# Patient Record
Sex: FEMALE | Race: WHITE | NOT HISPANIC OR LATINO | URBAN - METROPOLITAN AREA
[De-identification: names, ages, dates, MRNs, and addresses within clinical notes are randomized per-mention and may not be internally consistent; named-entity substitution may affect disease eponyms.]

---

## 2023-10-31 ENCOUNTER — INPATIENT (INPATIENT)
Facility: HOSPITAL | Age: 58
LOS: 4 days | Discharge: HOME HEALTH | DRG: 950 | End: 2023-11-05
Attending: PHYSICAL MEDICINE & REHABILITATION | Admitting: PHYSICAL MEDICINE & REHABILITATION
Payer: COMMERCIAL

## 2023-10-31 VITALS
HEIGHT: 63 IN | TEMPERATURE: 98 F | WEIGHT: 177.69 LBS | RESPIRATION RATE: 17 BRPM | DIASTOLIC BLOOD PRESSURE: 80 MMHG | SYSTOLIC BLOOD PRESSURE: 139 MMHG | OXYGEN SATURATION: 94 % | HEART RATE: 70 BPM

## 2023-10-31 DIAGNOSIS — Z98.890 OTHER SPECIFIED POSTPROCEDURAL STATES: Chronic | ICD-10-CM

## 2023-10-31 DIAGNOSIS — Z98.891 HISTORY OF UTERINE SCAR FROM PREVIOUS SURGERY: Chronic | ICD-10-CM

## 2023-10-31 DIAGNOSIS — Z96.611 PRESENCE OF RIGHT ARTIFICIAL SHOULDER JOINT: ICD-10-CM

## 2023-10-31 LAB
SARS-COV-2 RNA SPEC QL NAA+PROBE: SIGNIFICANT CHANGE UP
SARS-COV-2 RNA SPEC QL NAA+PROBE: SIGNIFICANT CHANGE UP

## 2023-10-31 RX ORDER — POLYETHYLENE GLYCOL 3350 17 G/17G
17 POWDER, FOR SOLUTION ORAL DAILY
Refills: 0 | Status: DISCONTINUED | OUTPATIENT
Start: 2023-10-31 | End: 2023-11-05

## 2023-10-31 RX ORDER — MORPHINE SULFATE 50 MG/1
15 CAPSULE, EXTENDED RELEASE ORAL EVERY 4 HOURS
Refills: 0 | Status: DISCONTINUED | OUTPATIENT
Start: 2023-10-31 | End: 2023-10-31

## 2023-10-31 RX ORDER — MORPHINE SULFATE 50 MG/1
30 CAPSULE, EXTENDED RELEASE ORAL EVERY 4 HOURS
Refills: 0 | Status: DISCONTINUED | OUTPATIENT
Start: 2023-10-31 | End: 2023-11-05

## 2023-10-31 RX ORDER — INFLUENZA VIRUS VACCINE 15; 15; 15; 15 UG/.5ML; UG/.5ML; UG/.5ML; UG/.5ML
0.5 SUSPENSION INTRAMUSCULAR ONCE
Refills: 0 | Status: DISCONTINUED | OUTPATIENT
Start: 2023-10-31 | End: 2023-11-05

## 2023-10-31 RX ORDER — ONDANSETRON 8 MG/1
4 TABLET, FILM COATED ORAL EVERY 6 HOURS
Refills: 0 | Status: DISCONTINUED | OUTPATIENT
Start: 2023-10-31 | End: 2023-11-05

## 2023-10-31 RX ORDER — SENNA PLUS 8.6 MG/1
2 TABLET ORAL AT BEDTIME
Refills: 0 | Status: DISCONTINUED | OUTPATIENT
Start: 2023-10-31 | End: 2023-11-05

## 2023-10-31 RX ORDER — LANOLIN ALCOHOL/MO/W.PET/CERES
3 CREAM (GRAM) TOPICAL AT BEDTIME
Refills: 0 | Status: DISCONTINUED | OUTPATIENT
Start: 2023-10-31 | End: 2023-10-31

## 2023-10-31 RX ORDER — CEPHALEXIN 500 MG
500 CAPSULE ORAL
Refills: 0 | Status: DISCONTINUED | OUTPATIENT
Start: 2023-10-31 | End: 2023-11-05

## 2023-10-31 RX ORDER — ENOXAPARIN SODIUM 100 MG/ML
40 INJECTION SUBCUTANEOUS EVERY 24 HOURS
Refills: 0 | Status: DISCONTINUED | OUTPATIENT
Start: 2023-10-31 | End: 2023-11-05

## 2023-10-31 RX ORDER — MORPHINE SULFATE 50 MG/1
15 CAPSULE, EXTENDED RELEASE ORAL EVERY 4 HOURS
Refills: 0 | Status: DISCONTINUED | OUTPATIENT
Start: 2023-10-31 | End: 2023-11-05

## 2023-10-31 RX ORDER — MORPHINE SULFATE 50 MG/1
30 CAPSULE, EXTENDED RELEASE ORAL EVERY 4 HOURS
Refills: 0 | Status: DISCONTINUED | OUTPATIENT
Start: 2023-10-31 | End: 2023-10-31

## 2023-10-31 RX ORDER — GABAPENTIN 400 MG/1
100 CAPSULE ORAL THREE TIMES A DAY
Refills: 0 | Status: DISCONTINUED | OUTPATIENT
Start: 2023-10-31 | End: 2023-11-05

## 2023-10-31 RX ORDER — DIPHENHYDRAMINE HCL 50 MG
25 CAPSULE ORAL EVERY 6 HOURS
Refills: 0 | Status: DISCONTINUED | OUTPATIENT
Start: 2023-10-31 | End: 2023-11-05

## 2023-10-31 RX ORDER — ACETAMINOPHEN 500 MG
975 TABLET ORAL EVERY 8 HOURS
Refills: 0 | Status: DISCONTINUED | OUTPATIENT
Start: 2023-10-31 | End: 2023-11-05

## 2023-10-31 RX ORDER — LANOLIN ALCOHOL/MO/W.PET/CERES
6 CREAM (GRAM) TOPICAL AT BEDTIME
Refills: 0 | Status: DISCONTINUED | OUTPATIENT
Start: 2023-10-31 | End: 2023-11-05

## 2023-10-31 RX ADMIN — Medication 5 MILLIGRAM(S): at 21:49

## 2023-10-31 RX ADMIN — Medication 6 MILLIGRAM(S): at 21:49

## 2023-10-31 RX ADMIN — Medication 975 MILLIGRAM(S): at 21:49

## 2023-10-31 RX ADMIN — GABAPENTIN 100 MILLIGRAM(S): 400 CAPSULE ORAL at 22:05

## 2023-10-31 RX ADMIN — MORPHINE SULFATE 30 MILLIGRAM(S): 50 CAPSULE, EXTENDED RELEASE ORAL at 22:22

## 2023-10-31 RX ADMIN — SENNA PLUS 2 TABLET(S): 8.6 TABLET ORAL at 21:49

## 2023-10-31 RX ADMIN — Medication 975 MILLIGRAM(S): at 22:22

## 2023-10-31 RX ADMIN — MORPHINE SULFATE 30 MILLIGRAM(S): 50 CAPSULE, EXTENDED RELEASE ORAL at 21:49

## 2023-10-31 NOTE — H&P ADULT - NSHPREVIEWOFSYSTEMS_GEN_ALL_CORE
REVIEW OF SYSTEMS  Constitutional: No fever, No Chills, No fatigue  HEENT: No eye pain, No visual disturbances, No difficulty hearing  Pulm: No cough,  No shortness of breath  Cardio: No chest pain, No palpitations  GI:  No abdominal pain, + nausea from car ride, No vomiting, No diarrhea, No constipation  : No dysuria, No frequency, No hematuria  Neuro: No headaches, No memory loss, No loss of strength, No numbness, No tremors  Skin: No itching, No rashes, No lesions   Endo: No temperature intolerance  MSK: + joint pain, No joint swelling, + muscle pain, No Neck or back pain  Psych:  No depression, No anxiety Constitutional: No fever, No Chills, No fatigue  HEENT: No eye pain, No visual disturbances, No difficulty hearing  Pulm: No cough,  No shortness of breath  Cardio: No chest pain, No palpitations  GI:  No abdominal pain, + nausea from car ride, No vomiting, No diarrhea, No constipation  : No dysuria, No frequency, No hematuria  Neuro: No headaches, No memory loss, No loss of strength, No numbness, No tremors  Skin: No itching, No rashes, No lesions   Endo: No temperature intolerance  MSK: + joint pain, No joint swelling, + muscle pain, No Neck or back pain  Psych:  No depression, No anxiety

## 2023-10-31 NOTE — H&P ADULT - NSICDXPASTSURGICALHX_GEN_ALL_CORE_FT
PAST SURGICAL HISTORY:  H/O  section     H/O myomectomy     History of Nissen fundoplication     S/P repair of paraesophageal hernia

## 2023-10-31 NOTE — H&P ADULT - ATTENDING COMMENTS
I independently performed the documented the history, exam, and medical decision making. I have made amendments to the documentation where necessary. I have personally seen and examined the patient. Medical records were reviewed and I have made amendments to the documentation where necessary and adjusted the history, physical examination, and plan as documented by the Resident Physician. Patient was seen and evaluated at bedside today. Reported no overnight events and is in no acute distress. Patient was thoroughly informed regarding precautions prior to admission by her surgical team. A discussion took place with her Orthopedic Surgeon, Dr. Nahid Du, regarding post-surgical precautions and these were communicated with OT and PT team. She is eager to participate on the recommended rehabilitation program. Denies any CP, SOB, LEOS, palpitations, fever, chills, body aches, cough, congestion, or any other symptoms at this time. Admission vitals, labs, and physical exam are outlined below.      LAB                        10.3   5.80  )-----------( 339      ( 01 Nov 2023 05:32 )             32.8     11-01    141  |  106  |  15  ----------------------------<  99  4.2   |  30  |  0.60    Ca    8.6      01 Nov 2023 05:32    TPro  5.7<L>  /  Alb  2.5<L>  /  TBili  0.2  /  DBili  x   /  AST  15  /  ALT  14  /  AlkPhos  67  11-01    LIVER FUNCTIONS - ( 01 Nov 2023 05:32 )  Alb: 2.5 g/dL / Pro: 5.7 g/dL / ALK PHOS: 67 U/L / ALT: 14 U/L / AST: 15 U/L / GGT: x             PHYSICAL EXAM  Vital Signs Last 24 Hrs  T(C): 36.6 (01 Nov 2023 08:02), Max: 36.9 (31 Oct 2023 18:30)  T(F): 97.9 (01 Nov 2023 08:02), Max: 98.5 (31 Oct 2023 18:30)  HR: 66 (01 Nov 2023 08:02) (66 - 74)  BP: 130/81 (01 Nov 2023 08:02) (130/76 - 139/80)  RR: 16 (01 Nov 2023 08:02) (15 - 17)  SpO2: 94% (01 Nov 2023 08:02) (94% - 96%)    Gen - NAD, Comfortable  HEENT - NCAT, EOMI  Pulm - CTAB, No wheeze, No rhonchi, No crackles  Cardiovascular - RRR, S1S2, No murmurs  Abdomen - Soft, NT/ND, +BS  Extremities - No C/C/E, No calf tenderness  Neuro-     Cognitive - AAOx3     Motor -                     LEFT    UE - ShAB 5/5, EF 5/5, EE 5/5, WE 5/5,  5/5                    RIGHT UE - unable to fully assess due to right jodi-shoulder brace. Patient able to extend and flex fingers and wrist within immobilizer                    LEFT    LE - HF 5/5, KE 5/5, DF 5/5, PF 5/5                    RIGHT LE - HF 5/5, KE 5/5, DF 5/5, PF 5/5        Sensory - Intact to LT distally     Reflexes - DTR Intact, No primitive reflexive  Psychiatric - Mood stable, Affect WNL; some anxiety expressed given post-surgicasl state  Skin:  large incisional sites across the biceps, right deltoid, right shoulder as well as across the back with a JAIMIE drain. No erythema, purulence, or fluctuance around incisional sites or JAIMIE drain insertion.  Wounds: None Present

## 2023-10-31 NOTE — H&P ADULT - NSHPSOCIALHISTORY_GEN_ALL_CORE
SOCIAL HISTORY  Smoking -  denies  EtOH - denies  Marital Status -   occupation: compu sci    Previous Functional Status:  Independent in ambulation, ADL's, transfers prior to hospitalization    Current Functional Status:  Bed mobility:   Transfers: sit-to-stand min-A. Supine to sit supervision.    Gait / ambulation:   ADL's: mod-I grooming/toileting/rolling/scooting/StS.   Speech: No swallowing deficits. SOCIAL HISTORY  Smoking -  former smoker 4 cig/day for 4 years over 30 years ago   EtOH - denies  Denied illicit substances and marijuana    occupation: compu sci    Previous Functional Status:  Independent in ambulation, ADL's, transfers prior to hospitalization    Current Functional Status:  Bed mobility:   Transfers: sit-to-stand min-A. Supine to sit supervision.    Gait / ambulation:   ADL's: mod-I grooming/toileting/rolling/scooting/StS.   Speech: No swallowing deficits.

## 2023-10-31 NOTE — H&P ADULT - NSHPLABSRESULTS_GEN_ALL_CORE
Laboratory-Chemistry:    10/24/2023    Glucose: 99    Sodium: 142    Potassium: 4.6    Blood Urea Nitrogen : 14    Creatinine: 0.6     Hematology:    10/24/2023    WBC: 4.8    HgB: 9.8    Hct: 30.7    Platelets: 245     Cultures:     10/24/2023    Covid -19 PCR: not detected   RSV type A: not detected   RSV type B: not detected   Adenovirus DNA: not detected  Human metapneumovirus RNA: not detected  Human rhinovirus/enterovirus RNA: not detected  Influenza type A RNA: not detected   Influenza type B RNA: not detected   Mycoplasma pneumoniae DNA: not detected   Chlamydophilia pneumoniae DNA: not detected  Parainfluenza virus type 1, 2, 3, 4 RNA: not detected     Urine Legionella: negative     Sputum expectorated: no fungal structures seen   Radiology:     10/18/2023  Right shoulder xray:   1. Since September 28, 2023  - right shoulder arthroplasty  2. No periprosthetic fractures or lucencies Laboratory-Chemistry:    10/24/2023    Glucose: 99    Sodium: 142    Potassium: 4.6    Blood Urea Nitrogen : 14    Creatinine: 0.6     Hematology:    10/24/2023    WBC: 4.8    HgB: 9.8    Hct: 30.7    Platelets: 245     Cultures:     10/24/2023    Covid -19 PCR: not detected   RSV type A: not detected   RSV type B: not detected   Adenovirus DNA: not detected  Human metapneumovirus RNA: not detected  Human rhinovirus/enterovirus RNA: not detected  Influenza type A RNA: not detected   Influenza type B RNA: not detected   Mycoplasma pneumoniae DNA: not detected   Chlamydophilia pneumoniae DNA: not detected  Parainfluenza virus type 1, 2, 3, 4 RNA: not detected     Urine Legionella: negative     Sputum expectorated: no fungal structures seen   Radiology:     10/18/2023  Right shoulder xray:   1. Since September 28, 2023  - right shoulder arthroplasty  2. No periprosthetic fractures or lucencies    X-ray 9/28/2023 shows unchanged mass extending from the right humeral head into the proximal metadiaphysis with an extraosseous soft tissue component consistent with chondrosarcoma

## 2023-10-31 NOTE — PATIENT PROFILE ADULT - FALL HARM RISK - HARM RISK INTERVENTIONS

## 2023-10-31 NOTE — H&P ADULT - NSICDXPASTMEDICALHX_GEN_ALL_CORE_FT
PAST MEDICAL HISTORY:  Chondrosarcoma     Chronic GERD     Hiatal hernia     [place] (hard of hearing)     Osteoarthritis of both knees     Synovial chondromatosis

## 2023-10-31 NOTE — H&P ADULT - NSHPPHYSICALEXAM_GEN_ALL_CORE
Gen - NAD, Comfortable  HEENT - NCAT, EOMI, MMM  Neck - Supple, No limited ROM  Pulm - CTAB, No wheeze, No rhonchi, No crackles  Cardiovascular - RRR, S1S2, No murmurs  Abdomen - Soft, NT/ND, +BS  Extremities - No C/C/E, No calf tenderness  Neuro-     Cognitive - AAOx3     Communication - Fluent, No dysarthria     Attention: Intact      Judgement: Good evidence of judgement     Memory: Recall 3 objects immediate and 3 min later         Cranial Nerves - CN 2-12 intact     Motor -                     LEFT    UE - ShAB 5/5, EF 5/5, EE 5/5, WE 5/5,  5/5                    RIGHT UE - unable to fully assess. Patient able to extend and flex fingers and wrist within immobilizer                    LEFT    LE - HF 5/5, KE 5/5, DF 5/5, PF 5/5                    RIGHT LE - HF 5/5, KE 5/5, DF 5/5, PF 5/5        Sensory - Intact to LT     Reflexes - DTR Intact, No primitive reflexive     Coordination - FTN intact     Tone - normal  Psychiatric - Mood stable, Affect WNL  Skin:  large incisional sites across the biceps, right deltoid, right shoulder as well as across the back with a JAIMIE drain. No erythema, purulence, or fluctuance around incisional sites or JAIMIE drain insertion.  Wounds: None Present

## 2023-10-31 NOTE — H&P ADULT - ASSESSMENT
58 y.o [place] female PMH GERD (s/p Nissen fundoplication x3), hiatal and paraesophageal hernia (laparoscopic paraesophageal hernia repair) , bursitis, b/l chronic knee pain, synovial chondromatosis, partial paralysis of vocal cords,  myomectomy, sinus surgery, , ear surgery, right shoulder surgery x6 presented to [facility] [place] on 10/18 for chondrosarcoma of the right proximal humerus and is s/p composite resection of the tumor including axillary nerve, lateral deltoid, humeral head and total shoulder arthroplasty with allograft and Latissmus dorsus rotational flap. Right UE is NWB throughout with no R-arm abduction past 90 degrees. JAIMIE drain remains upon discharge to rehab and should be stripped at least twice a day by rehab staff. Total output number should be recorded and uploaded to patient's portal. Output must be less than 30cc for 2 days before removal. Patient was evaluated by PM&R and therapy for functional deficits, gait/ADL impairments and acute rehabilitation was recommended. Patient was medically optimized for discharge to [facility] IRU on 10/31.       IVAN REY is a 58y [place] female with GERD (s/p Nissen fundoplication x3), hiatal and paraesophageal hernia (laparoscopic paraesophageal hernia repair) , bursitis, b/l chronic knee pain, synovial chondromatosis, partial paralysis of vocal cords,  myomectomy, sinus surgery, , ear surgery, right shoulder surgery x6 presented to [facility] [place] for R humerus bone lesion ccb increased pain and limited movement. 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 She is now s/p wide local resection of R shoulder chondrosarcoma & reconstruction with Allograft, Latissmus dorsus rotational flap on 10/18. Patient now admitted for a multidisciplinary rehab program. 10-31-23.       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 P&O as needed       HTN  -   - Monitor BP    HLD  - cont statin    T2DM  - Lantus  - Premeal  - SSI  - Monitor fingersticks    Pain  - Tylenol PRN  - Avoid sedating medications that may interfere with cognitive recovery    Mood / Cognition  - Neuropsychology consult  - [ ] Enhanced Supervision  [ ] Constant Observation    Sleep  - Maintain quiet hours and a low stim environment.   - Monitor sleep logs (for BIU)  - Melatonin PRN     GI / Bowel  - Senna qHS  - Miralax PRN Daily  - GI ppx: ***     / Bladder  - Currently patient voids:      [ ] independent      [ ] external collection device (condom cath)      [ ] Indwelling bowman catheter      [ ] Intermittent catheterization  - Continue bladder scans Q8 hours with straight cath for >400cc.  - Toileting schedule every 4 hours    Skin / Pressure injury  - Skin assessment on admission performed [  ] :   - Monitor Incisions:    - Turn q2 hours in bed while awake, air mattress  - nursing to monitor skin qShift  - soft heel protectors  - skin barrier cream PRN    Diet/Dysphagia:  - Diet Consistency: ***  - Supplements: ***  - Aspiration Precautions  - SLP consult for swallow function evaluation and treatment  - Nutrition consult    DVT prophylaxis:   - lovneox 40  - SCDs    Outpatient Follow-up:  Meghana Donohue MD [facility] [place] plastic surgeon, [phone]      ---------------    Goals: Safe discharge to home  Estimated Length of Stay: 10-14 days  Rehab Potential: Good  Medical Prognosis: Good  Estimated Disposition: Home with home care      PRESCREEN COMPARISON:  I have reviewed the prescreen information and I have found no relevant changes between the preadmission screening and my post admission evaluation.    RATIONALE FOR INPATIENT ADMISSION: Patient demonstrates the following:  [X] Medically appropriate for rehabilitation admission  [X] Has attainable rehab goals with an appropriate initial discharge plan  [X]Has rehabilitation potential (expected to make a significant improvement within a reasonable period of time)  [X] Requires close medical management by a rehab physician, rehab nursing care, Hospitalist and comprehensive interdisciplinary team (including PT, OT and/or SLP, Prosthetics and Orthotics)        Maintaining R UE NWB throughout.   Right shoulder sling: no R arm abduction past 90 degrees     10/30/2023 Orthopedic note: JAIMIE drain back   JAIMIE drain care in rehab. Per plastics team, JAIMIE drain to remain upon discharge to rehab. JAIMIE drain should be stripped at least twice a day by rehab staff. Total output number should be recorded and uploaded to patient's portal. Once patient has been discharged from rehab she will see Dr. Srinivasan in her outpatient follow up visit where drain will be removed in clinic. JAIMIE drain output must be less than 30ccs x 2 days before removal.  IVAN REY is a 58y [place] female with GERD (s/p Nissen fundoplication x3), hiatal and paraesophageal hernia (laparoscopic paraesophageal hernia repair) , bursitis, b/l chronic knee pain, synovial chondromatosis, partial paralysis of vocal cords,  myomectomy, sinus surgery, , ear surgery, right shoulder surgery x6 presented to [facility] [place] for R humerus bone lesion ccb increased pain and limited movement. 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 She is now s/p wide local resection of R shoulder chondrosarcoma & reconstruction with Allograft, Latissmus dorsus rotational flap on 10/18. Patient now admitted for a multidisciplinary rehab program. 10-31-23.       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 Should be stripped at least twice a day by rehab staff. Total output number should be recorded and uploaded to patient's portal. Output must be less than 30cc for 2 days before removal.   - Comprehensive Multidisciplinary Rehab Program: Start comprehensive rehab program of PT/OT - 3 hours a day, 5 days a week.  - pain control as below  - f/u plastics outpatient.   - cephalexin 500mg q6    Pain  - Tylenol 975mg q8  - Morphine 15mg and 30mg PO. q4h  - gabapentin 100mg TID      Mood / Cognition  - Neuropsychology consult    Sleep  - Melatonin PRN     GI / Bowel  - Senna qHS  - Miralax Daily  - dulcolax 5mg PO.   - GI ppx: ***     / Bladder  - Continue bladder scans Q8 hours with straight cath for >400cc.  - Toileting schedule every 4 hours    Skin / Pressure injury  - Skin assessment on admission performed [  ] :   - Monitor Incisions:      Diet/Dysphagia:  - Diet Consistency: ***  - Supplements: ***  - Aspiration Precautions  - SLP consult for swallow function evaluation and treatment  - Nutrition consult    DVT prophylaxis:   - lovneox 40  - SCDs    Outpatient Follow-up:  Meghana Donohue MD [facility] [place] plastic surgeon, [phone]      ---------------    Goals: Safe discharge to home  Estimated Length of Stay: 10-14 days  Rehab Potential: Good  Medical Prognosis: Good  Estimated Disposition: Home with home care      PRESCREEN COMPARISON:  I have reviewed the prescreen information and I have found no relevant changes between the preadmission screening and my post admission evaluation.    RATIONALE FOR INPATIENT ADMISSION: Patient demonstrates the following:  [X] Medically appropriate for rehabilitation admission  [X] Has attainable rehab goals with an appropriate initial discharge plan  [X]Has rehabilitation potential (expected to make a significant improvement within a reasonable period of time)  [X] Requires close medical management by a rehab physician, rehab nursing care, Hospitalist and comprehensive interdisciplinary team (including PT, OT and/or SLP, Prosthetics and Orthotics)        Maintaining R UE NWB throughout.   Right shoulder sling: no R arm abduction past 90 degrees     10/30/2023 Orthopedic note: JAIMIE drain back   JAIMIE drain care in rehab. Per plastics team, JAIMIE drain to remain upon discharge to rehab. JAIMIE drain should be stripped at least twice a day by rehab staff. Total output number should be recorded and uploaded to patient's portal. Once patient has been discharged from rehab she will see Dr. Srinivasan in her outpatient follow up visit where drain will be removed in clinic. JAIMIE drain output must be less than 30ccs x 2 days before removal.  IVAN REY is a 58y [place] female with GERD (s/p Nissen fundoplication x3), hiatal and paraesophageal hernia (laparoscopic paraesophageal hernia repair) , bursitis, b/l chronic knee pain, synovial chondromatosis, partial paralysis of vocal cords,  myomectomy, sinus surgery, , ear surgery, right shoulder surgery x6 [place] [facility] [place] for R humerus bone lesion ccb increased pain and limited movement. 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 She is now s/p wide local resection of R shoulder chondrosarcoma & reconstruction with Allograft, Latissmus dorsus rotational flap on 10/18. Patient now admitted for a multidisciplinary rehab program. 10-31-23.       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 Should be stripped at least twice a day by rehab staff. Total output number should be recorded and uploaded to patient's portal. Output must be less than 30cc for 2 days before removal.   - Comprehensive Multidisciplinary Rehab Program: Start comprehensive rehab program of PT/OT - 3 hours a day, 5 days a week.  - pain control as below  - f/u plastics outpatient.   - cephalexin 500mg q6    Pain  - Tylenol 975mg q8  - Morphine 15mg and 30mg PO. q4h  - gabapentin 100mg TID    nausea  -zofran     Mood / Cognition  - Neuropsychology consult    Sleep  - Melatonin 3mg PRN     GI / Bowel  - Senna qHS  - Miralax Daily  - dulcolax 5mg PO.   - GI ppx: ***     / Bladder  - Continue bladder scans Q8 hours with straight cath for >400cc.  - Toileting schedule every 4 hours    Skin / Pressure injury  - Skin assessment on admission performed [  ] :   - Monitor Incisions:      Diet/Dysphagia:  - Diet Consistency: ***  - Supplements: ***  - Aspiration Precautions  - SLP consult for swallow function evaluation and treatment  - Nutrition consult    DVT prophylaxis:   - lovneox 40  - SCDs    Outpatient Follow-up:  Meghana Donohue MD [facility] [place] plastic surgeon, [phone]      ---------------    Goals: Safe discharge to home  Estimated Length of Stay: 10-14 days  Rehab Potential: Good  Medical Prognosis: Good  Estimated Disposition: Home with home care      PRESCREEN COMPARISON:  I have reviewed the prescreen information and I have found no relevant changes between the preadmission screening and my post admission evaluation.    RATIONALE FOR INPATIENT ADMISSION: Patient demonstrates the following:  [X] Medically appropriate for rehabilitation admission  [X] Has attainable rehab goals with an appropriate initial discharge plan  [X]Has rehabilitation potential (expected to make a significant improvement within a reasonable period of time)  [X] Requires close medical management by a rehab physician, rehab nursing care, Hospitalist and comprehensive interdisciplinary team (including PT, OT and/or SLP, Prosthetics and Orthotics)        Maintaining R UE NWB throughout.   Right shoulder sling: no R arm abduction past 90 degrees     10/30/2023 Orthopedic note: JAIMIE drain back   JAIMIE drain care in rehab. Per plastics team, JAIMIE drain to remain upon discharge to rehab. JAIMIE drain should be stripped at least twice a day by rehab staff. Total output number should be recorded and uploaded to patient's portal. Once patient has been discharged from rehab she will see Dr. Srinivasan in her outpatient follow up visit where drain will be removed in clinic. JAIMIE drain output must be less than 30ccs x 2 days before removal.  IVAN REY is a 58y [place] female with GERD (s/p Nissen fundoplication x3), hiatal and paraesophageal hernia (laparoscopic paraesophageal hernia repair) , bursitis, b/l chronic knee pain, synovial chondromatosis, partial paralysis of vocal cords,  myomectomy, sinus surgery, , ear surgery, right shoulder surgery x6 presented to [facility] [place] for R humerus bone lesion ccb increased pain and limited movement. 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 She is now s/p wide local resection of R shoulder chondrosarcoma & reconstruction with Allograft, Latissmus dorsus rotational flap on 10/18. Patient now admitted for a multidisciplinary rehab program. 10-31-23.       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 Should be stripped at least twice a day by rehab staff. Total output number should be recorded and uploaded to patient's portal. Output must be less than 30cc for 2 days before removal.   - Comprehensive Multidisciplinary Rehab Program: Start comprehensive rehab program of PT/OT - 3 hours a day, 5 days a week.  - pain control as below  - f/u plastics outpatient.   - cephalexin 500mg q6    Pain  - Tylenol 975mg q8  - Morphine 15mg and 30mg PO. q4h  - gabapentin 100mg TID    nausea  -zofran     Mood / Cognition  - Neuropsychology consult would likely be of benefit given anxieties and processing of diagnosis as well as change in functional status from independence     Sleep  - Melatonin 3mg PRN     GI / Bowel  - Senna qHS  - Miralax Daily  - dulcolax 5mg PO.   - GI ppx: none     / Bladder  - Continue bladder scans Q8 hours with straight cath for >400cc.  - Toileting schedule every 4 hours    Skin / Pressure injury  - Skin assessment on admission performed [X] :   - Monitor Incisions:  multiple incisional sites across the RUE from the shoulder to the elbow. Clean, dry, intact. No erythema, drainage, or fluctuance.  - JAIMIE drain from back draining serosanguinous fluid    Diet/Dysphagia:  - Diet Consistency: regular  - Supplements: none  - Aspiration Precautions  - SLP consult for swallow function evaluation and treatment given history of vocal cord paralysis   - Nutrition consult    DVT prophylaxis:   - lovneox 40  - SCDs    Outpatient Follow-up:  Meghana Donohue MD [facility] [place] plastic surgeon, [phone]  Nahid Palomo [phone]    ---------------    Goals: Safe discharge to home  Estimated Length of Stay: 10-14 days  Rehab Potential: Good  Medical Prognosis: Good  Estimated Disposition: Home with home care      PRESCREEN COMPARISON:  I have reviewed the prescreen information and I have found no relevant changes between the preadmission screening and my post admission evaluation.    RATIONALE FOR INPATIENT ADMISSION: Patient demonstrates the following:  [X] Medically appropriate for rehabilitation admission  [X] Has attainable rehab goals with an appropriate initial discharge plan  [X]Has rehabilitation potential (expected to make a significant improvement within a reasonable period of time)  [X] Requires close medical management by a rehab physician, rehab nursing care, Hospitalist and comprehensive interdisciplinary team (including PT, OT and/or SLP, Prosthetics and Orthotics)        Maintaining R UE NWB throughout.   Right shoulder sling: no R arm abduction past 90 degrees     10/30/2023 Orthopedic note: JAIMIE drain back   JAIMIE drain care in rehab. Per plastics team, JAIMIE drain to remain upon discharge to rehab. JAIMIE drain should be stripped at least twice a day by rehab staff. Total output number should be recorded and uploaded to patient's portal. Once patient has been discharged from rehab she will see Dr. Srinivasan in her outpatient follow up visit where drain will be removed in clinic. JAIMIE drain output must be less than 30ccs x 2 days before removal.  IVAN REY is a 58y [place] female with GERD (s/p Nissen fundoplication x3), hiatal and paraesophageal hernia (laparoscopic paraesophageal hernia repair) , bursitis, b/l chronic knee pain, synovial chondromatosis, partial paralysis of vocal cords,  myomectomy, sinus surgery, , ear surgery, right shoulder surgery x6 [place] [facility] [place]erus bone lesion ccb increased pain and limited movement. 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 She is now s/p wide local resection of R shoulder chondrosarcoma & reconstruction with Allograft, Latissmus dorsus rotational flap on 10/18. Patient now admitted for a multidisciplinary rehab program. 10-31-23.       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 Should be stripped at least twice a day by rehab staff. Total output number should be recorded and uploaded to patient's portal. Output must be less than 30cc for 2 days before removal.   - Comprehensive Multidisciplinary Rehab Program: Start comprehensive rehab program of PT/OT - 3 hours a day, 5 days a week.  - pain control as below  - f/u plastics outpatient.   - cephalexin 500mg q6    Pain  - Tylenol 975mg q8  - Morphine 15mg and 30mg PO. q4h  - gabapentin 100mg TID    nausea  -zofran     Mood / Cognition  - Neuropsychology consult would likely be of benefit given anxieties and processing of diagnosis as well as change in functional status from independence     Sleep  - Melatonin 3mg PRN     GI / Bowel  - LBM 10/29  - Senna qHS  - Miralax Daily  - dulcolax 5mg PO.   - GI ppx: none     / Bladder  - Continue bladder scans Q8 hours with straight cath for >400cc.  - Toileting schedule every 4 hours    Skin / Pressure injury  - Skin assessment on admission performed [X] :   - Monitor Incisions:  multiple incisional sites across the RUE from the shoulder to the elbow. Clean, dry, intact. No erythema, drainage, or fluctuance.  - JAIMIE drain from back draining serosanguinous fluid    Diet/Dysphagia:  - Diet Consistency: regular  - Supplements: none  - Aspiration Precautions  - SLP consult for swallow function evaluation and treatment given history of vocal cord paralysis   - Nutrition consult    DVT prophylaxis:   - lovneox 40  - SCDs    Outpatient Follow-up:  Meghana Donohue MD [facility] [place], [phone]  Nahid Palomo [phone]    ---------------    Goals: Safe discharge to home  Estimated Length of Stay: 10-14 days  Rehab Potential: Good  Medical Prognosis: Good  Estimated Disposition: Home with home care      PRESCREEN COMPARISON:  I have reviewed the prescreen information and I have found no relevant changes between the preadmission screening and my post admission evaluation.    RATIONALE FOR INPATIENT ADMISSION: Patient demonstrates the following:  [X] Medically appropriate for rehabilitation admission  [X] Has attainable rehab goals with an appropriate initial discharge plan  [X]Has rehabilitation potential (expected to make a significant improvement within a reasonable period of time)  [X] Requires close medical management by a rehab physician, rehab nursing care, Hospitalist and comprehensive interdisciplinary team (including PT, OT and/or SLP, Prosthetics and Orthotics)        Maintaining R UE NWB throughout.   Right shoulder sling: no R arm abduction past 90 degrees     10/30/2023 Orthopedic note: JAIMIE drain back   JAIMIE drain care in rehab. Per plastics team, JAIMIE drain to remain upon discharge to rehab. JAIMIE drain should be stripped at least twice a day by rehab staff. Total output number should be recorded and uploaded to patient's portal. Once patient has been discharged from rehab she will see Dr. Srinivasan in her outpatient follow up visit where drain will be removed in clinic. JAIMIE drain output must be less than 30ccs x 2 days before removal.  IVAN REY is a 58y [place] female with GERD (s/p Nissen fundoplication x3), hiatal and paraesophageal hernia (laparoscopic paraesophageal hernia repair) , bursitis, b/l chronic knee pain, synovial chondromatosis, partial paralysis of vocal cords,  myomectomy, sinus surgery, , ear surgery, right shoulder surgery x6 presented to [facility] [place] for R humerus bone lesion ccb increased pain and limited movement. 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 She is now s/p wide local resection of R shoulder chondrosarcoma & reconstruction with Allograft, Latissmus dorsus rotational flap on 10/18. Patient now admitted for a multidisciplinary rehab program. 10-31-23.       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 Should be stripped at least twice a day by rehab staff. Total output number should be recorded and uploaded to patient's portal. Output must be less than 30cc for 2 days before removal.   - Comprehensive Multidisciplinary Rehab Program: Start comprehensive rehab program of PT/OT - 3 hours a day, 5 days a week.  - pain control as below  - f/u plastics outpatient.   - cephalexin 500mg q6    Pain  - Tylenol 975mg q8  - Morphine 15mg and 30mg PO. q4h  - gabapentin 100mg TID    nausea  -zofran     Mood / Cognition  - Neuropsychology consult would likely be of benefit given anxieties and processing of diagnosis as well as change in functional status from independence     Sleep  - Melatonin 3mg PRN     GI / Bowel  - LBM 10/29  - Senna qHS  - Miralax Daily  - GI ppx: none     / Bladder  - Continue bladder scans Q8 hours with straight cath for >400cc.  - Toileting schedule every 4 hours    Skin / Pressure injury  - Skin assessment on admission performed [X] :   - Monitor Incisions:  multiple incisional sites across the RUE from the shoulder to the elbow. Clean, dry, intact. No erythema, drainage, or fluctuance.  - JAIMIE drain from back draining serosanguinous fluid    Diet/Dysphagia:  - Diet Consistency: regular  - Supplements: none  - Aspiration Precautions  - SLP consult for swallow function evaluation and treatment given history of vocal cord paralysis   - Nutrition consult    DVT prophylaxis:   - lovneox 40  - SCDs    Outpatient Follow-up:  Meghana Donohue MD [facility] [place] plastic surgeon, [phone]  Nahid Palomo [phone]    ---------------    Goals: Safe discharge to home  Estimated Length of Stay: 10-14 days  Rehab Potential: Good  Medical Prognosis: Good  Estimated Disposition: Home with home care      PRESCREEN COMPARISON:  I have reviewed the prescreen information and I have found no relevant changes between the preadmission screening and my post admission evaluation.    RATIONALE FOR INPATIENT ADMISSION: Patient demonstrates the following:  [X] Medically appropriate for rehabilitation admission  [X] Has attainable rehab goals with an appropriate initial discharge plan  [X]Has rehabilitation potential (expected to make a significant improvement within a reasonable period of time)  [X] Requires close medical management by a rehab physician, rehab nursing care, Hospitalist and comprehensive interdisciplinary team (including PT, OT and/or SLP, Prosthetics and Orthotics)        Maintaining R UE NWB throughout.   Right shoulder sling: no R arm abduction past 90 degrees     10/30/2023 Orthopedic note: JAIMIE drain back   JAIMIE drain care in rehab. Per plastics team, JAIMIE drain to remain upon discharge to rehab. JAIMIE drain should be stripped at least twice a day by rehab staff. Total output number should be recorded and uploaded to patient's portal. Once patient has been discharged from rehab she will see Dr. Srinivasan in her outpatient follow up visit where drain will be removed in clinic. JAIMIE drain output must be less than 30ccs x 2 days before removal.  Mrs. Danielle Mathew is a 58 year old right handed female patient with past medical history of being [place], GERD (s/p Nissen fundoplication x3), hiatal and paraesophageal hernia (laparoscopic paraesophageal hernia repair) , bursitis, b/l chronic knee pain, synovial chondromatosis, partial paralysis of vocal cords, myomectomy, sinus surgery, , ear surgery, and right shoulder surgery x6 who is admitted for Acute Inpatient Rehabilitation with a multidisciplinary rehab program at [facility] with functional impairments in ADLs and mobility secondary to having a right proximal humerus chondrosarcoma treated surgically with a composite resection of the tumor including axillary nerve, lateral deltoid, and humeral head followed by total shoulder arthroplasty with allograft and latissimus dorsi rotational flap by Dr. Nahid Du on 10/18 with closure by Plastic Surgery.      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  - S/P composite resection of the tumor including axillary nerve, lateral deltoid, and humeral head followed by total shoulder arthroplasty with allograft and latissimus dorsi rotational flap on 10/18  - X-ray 2023 - unchanged mass extending from the right humeral head into the proximal metadiaphysis with an extraosseous soft tissue component consistent with chondrosarcoma  - X-ray 10/18 - right shoulder arthroplasty  - Impaired ADLs and mobility  - Restricted motion on dominant right side - needs dexterity training  - Need for assistance with personal care  - Post-surgical precautions:  ·	Right shoulder - NWB with no clearance for ROM to remain on jodi-shoulder brace  ·	Right elbow - may perform active and active assisted flexion and extension as tolerated while maintaining shoulder precautions.  ·	Right wrist - may perform active and active assisted flexion, extension, radial deviation and ulnar deviation as tolerated while maintaining shoulder precautions.  ·	Monitor JAIMIE drain. Should be stripped at least twice a day. Total output number should be recorded and uploaded to patient's portal. Output must be less than 30cc for 2 days before removal.   - Comprehensive Multidisciplinary Rehab Program: Start comprehensive rehab program of PT/OT - 3 hours a day, 5 days a week.  - Pain control as below  - Plastic Surgery follow-up as outpatient.   - Cephalexin 500mg q6    Pain  - Tylenol 975mg q8  - Morphine 15mg and 30mg PO. q4h  - gabapentin 100mg TID    nausea  - Zofran     Mood / Cognition  - Neuropsychology consult would likely be of benefit given anxieties and processing of diagnosis as well as change in functional status from independence     Sleep  - Melatonin 3mg PRN     GI / Bowel  - LBM 10/29  - Senna qHS  - Miralax Daily  - GI ppx: none     / Bladder  - Continue bladder scans Q8 hours with straight cath for >400cc.  - Toileting schedule every 4 hours    Skin / Pressure injury  - Skin assessment on admission performed [X] :   - Monitor Incisions:  multiple incisional sites across the RUE from the shoulder to the elbow. Clean, dry, intact. No erythema, drainage, or fluctuance.  - JAIMIE drain from back draining serosanguinous fluid    Diet/Dysphagia:  - Diet Consistency: regular  - Supplements: none  - Aspiration Precautions  - SLP consult for swallow function evaluation and treatment given history of vocal cord paralysis   - Nutrition consult    DVT prophylaxis:   - lovneox 40  - SCDs    Outpatient Follow-up:    Orthopedic Surgery at [facility] [place]   Nahid Palomo   [phone]    Plastic Surgery at [facility] [place]   Meghana Donohue MD   [phone]      ---------------    Goals: Safe discharge to home  Estimated Length of Stay: 10-14 days  Rehab Potential: Good  Medical Prognosis: Good  Estimated Disposition: Home with home care      PRESCREEN COMPARISON:  I have reviewed the prescreen information and I have found no relevant changes between the preadmission screening and my post admission evaluation.    RATIONALE FOR INPATIENT ADMISSION: Patient demonstrates the following:  [X] Medically appropriate for rehabilitation admission  [X] Has attainable rehab goals with an appropriate initial discharge plan  [X]Has rehabilitation potential (expected to make a significant improvement within a reasonable period of time)  [X] Requires close medical management by a rehab physician, rehab nursing care, Hospitalist and comprehensive interdisciplinary team (including PT, OT and/or SLP, Prosthetics and Orthotics)        Maintaining R UE NWB throughout.   Right shoulder sling: no R arm abduction past 90 degrees     10/30/2023 Orthopedic note: JAIMIE drain back   JAIMIE drain care in rehab. Per plastics team, JAIMIE drain to remain upon discharge to rehab. JAIMIE drain should be stripped at least twice a day by rehab staff. Total output number should be recorded and uploaded to patient's portal. Once patient has been discharged from rehab she will see Dr. Srinivasan in her outpatient follow up visit where drain will be removed in clinic. JAIMIE drain output must be less than 30ccs x 2 days before removal.  Mrs. Danielle Mathew is a 58 year old right handed female patient with past medical history of being [place], GERD (s/p Nissen fundoplication x3), hiatal and paraesophageal hernia (laparoscopic paraesophageal hernia repair) , bursitis, b/l chronic knee pain, synovial chondromatosis, partial paralysis of vocal cords, myomectomy, sinus surgery, , ear surgery, and right shoulder surgery x6 who is admitted for Acute Inpatient Rehabilitation with a multidisciplinary rehab program at [facility] with functional impairments in ADLs and mobility secondary to having a right proximal humerus chondrosarcoma treated surgically with a composite resection of the tumor including axillary nerve, lateral deltoid, and humeral head followed by total shoulder arthroplasty with allograft and latissimus dorsi rotational flap by Dr. Nahid Du on 10/18 with closure by Plastic Surgery.      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  - S/P composite resection of the tumor including axillary nerve, lateral deltoid, and humeral head followed by total shoulder arthroplasty with allograft and latissimus dorsi rotational flap on 10/18  - X-ray 2023 - unchanged mass extending from the right humeral head into the proximal metadiaphysis with an extraosseous soft tissue component consistent with chondrosarcoma  - X-ray 10/18 - right shoulder arthroplasty  - Impaired ADLs and mobility  - Restricted motion on dominant right side - needs dexterity training  - Need for assistance with personal care  - Post-surgical precautions:  ·	Right shoulder - NWB with no clearance for ROM to remain on jodi-shoulder brace  ·	Right elbow - may perform active and active assisted flexion and extension as tolerated while maintaining shoulder precautions.  ·	Right wrist - may perform active and active assisted flexion, extension, radial deviation and ulnar deviation as tolerated while maintaining shoulder precautions.  ·	Monitor JAIMIE drain. Should be stripped at least twice a day. Total output number should be recorded and uploaded to patient's portal. Output must be less than 30cc for 2 days before removal.   - Comprehensive Multidisciplinary Rehab Program: Start comprehensive rehab program of PT/OT - 3 hours a day, 5 days a week.  - Pain control as below  - Plastic Surgery follow-up as outpatient.   - Cephalexin 500mg q6    Pain  - Tylenol 975mg q8  - Morphine 15mg and 30mg PO. q4h  - gabapentin 100mg TID    nausea  - Zofran     Mood / Cognition  - Neuropsychology consult would likely be of benefit given anxieties and processing of diagnosis as well as change in functional status from independence     Sleep  - Melatonin 3mg PRN     GI / Bowel  - LBM 10/29  - Senna qHS  - Miralax Daily  - GI ppx: none     / Bladder  - Continue bladder scans Q8 hours with straight cath for >400cc.  - Toileting schedule every 4 hours    Skin / Pressure injury  - Skin assessment on admission performed [X] :   - Monitor Incisions:  multiple incisional sites across the RUE from the shoulder to the elbow. Clean, dry, intact. No erythema, drainage, or fluctuance.  - JAIMIE drain from back draining serosanguinous fluid    Diet/Dysphagia:  - Diet Consistency: regular  - Supplements: none  - Aspiration Precautions  - SLP consult for swallow function evaluation and treatment given history of vocal cord paralysis   - Nutrition consult    DVT prophylaxis:   - lovneox 40  - SCDs    Outpatient Follow-up:    Orthopedic Surgery at [facility] [place]   Nahid Palomo   [phone]    Plastic Surgery at [facility] [place]   Meghana Donohue MD   [phone]      ---------------    Goals: Safe discharge to home  Estimated Length of Stay: 5-10 days  Rehab Potential: Good  Medical Prognosis: Good  Estimated Disposition: Home with home care      PRESCREEN COMPARISON:  I have reviewed the prescreen information and I have found no relevant changes between the preadmission screening and my post admission evaluation.    RATIONALE FOR INPATIENT ADMISSION: Patient demonstrates the following:  [X] Medically appropriate for rehabilitation admission  [X] Has attainable rehab goals with an appropriate initial discharge plan  [X]Has rehabilitation potential (expected to make a significant improvement within a reasonable period of time)  [X] Requires close medical management by a rehab physician, rehab nursing care, Hospitalist and comprehensive interdisciplinary team (including PT, OT and/or SLP, Prosthetics and Orthotics)        Maintaining R UE NWB throughout.   Right shoulder sling: no R arm abduction past 90 degrees     10/30/2023 Orthopedic note: JAIMIE drain back   JAIMIE drain care in rehab. Per plastics team, JAIMIE drain to remain upon discharge to rehab. JAIMIE drain should be stripped at least twice a day by rehab staff. Total output number should be recorded and uploaded to patient's portal. Once patient has been discharged from rehab she will see Dr. Srinivasan in her outpatient follow up visit where drain will be removed in clinic. JAIMIE drain output must be less than 30ccs x 2 days before removal.

## 2023-10-31 NOTE — H&P ADULT - REASON FOR ADMISSION
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

## 2023-10-31 NOTE — H&P ADULT - HISTORY OF PRESENT ILLNESS
58 y.o [place] female PMH GERD (s/p Nissen fundoplication x3), hiatal and paraesophageal hernia (laparoscopic paraesophageal hernia repair) , bursitis, b/l chronic knee pain, synovial chondromatosis, partial paralysis of vocal cords,  myomectomy, sinus surgery, , ear surgery, right shoulder surgery x6 presented to [facility] on 10/18 for chondrosarcoma of the right proximal humerus and is s/p composite resection of the tumor including axillary nerve, lateral deltoid, humeral head and total shoulder arthroplasty with allograft and Latissmus dorsus rotational flap. Right UE is NWB throughout with no R-arm abduction past 90 degrees. JAIMIE drain remains upon discharge to rehab and should be stripped at least twice a day by rehab staff. Total output number should be recorded and uploaded to patient's portal. Output must be less than 30cc for 2 days before removal. Patient was evaluated by PM&R and therapy for functional deficits, gait/ADL impairments and acute rehabilitation was recommended. Patient was medically optimized for discharge to [facility] IRU on 10/31.          Mrs. Danielle Mathew is a 58 year old right handed female patient with past medical history of being [place], GERD (s/p Nissen fundoplication x3), hiatal and paraesophageal hernia (laparoscopic paraesophageal hernia repair) , bursitis, b/l chronic knee pain, synovial chondromatosis, partial paralysis of vocal cords, myomectomy, sinus surgery, , ear surgery, and right shoulder surgery x6 who presented to [facility] [place] on 10/18 for surgical management of a chondrosarcoma of the right proximal humerus and is currently s/p composite resection of the tumor including axillary nerve, lateral deltoid, humeral head and total shoulder arthroplasty with allograft and latissimus dorsi rotational flap by Dr. Nahid Du. Right UE is NWB throughout with no shoulder ROM to be attempted. Per discussion with surgeon, patient may perform right wrist and right elbow active and active assisted flexion and extension as tolerated while maintaining shoulder precautions. JAIMIE drain remains upon discharge to rehab and should be stripped at least twice a day by rehab staff. Total output number should be recorded and uploaded to patient's portal. Output must be less than 30cc for 2 days before removal. Patient was evaluated by PM&R and therapy for functional deficits, gait/ADL impairments and acute rehabilitation was recommended. Patient was medically optimized for discharge to [facility] IRF on 10/31.

## 2023-11-01 LAB
ALBUMIN SERPL ELPH-MCNC: 2.5 G/DL — LOW (ref 3.3–5)
ALBUMIN SERPL ELPH-MCNC: 2.5 G/DL — LOW (ref 3.3–5)
ALP SERPL-CCNC: 67 U/L — SIGNIFICANT CHANGE UP (ref 40–120)
ALP SERPL-CCNC: 67 U/L — SIGNIFICANT CHANGE UP (ref 40–120)
ALT FLD-CCNC: 14 U/L — SIGNIFICANT CHANGE UP (ref 10–45)
ALT FLD-CCNC: 14 U/L — SIGNIFICANT CHANGE UP (ref 10–45)
ANION GAP SERPL CALC-SCNC: 5 MMOL/L — SIGNIFICANT CHANGE UP (ref 5–17)
ANION GAP SERPL CALC-SCNC: 5 MMOL/L — SIGNIFICANT CHANGE UP (ref 5–17)
AST SERPL-CCNC: 15 U/L — SIGNIFICANT CHANGE UP (ref 10–40)
AST SERPL-CCNC: 15 U/L — SIGNIFICANT CHANGE UP (ref 10–40)
BASOPHILS # BLD AUTO: 0.14 K/UL — SIGNIFICANT CHANGE UP (ref 0–0.2)
BASOPHILS # BLD AUTO: 0.14 K/UL — SIGNIFICANT CHANGE UP (ref 0–0.2)
BASOPHILS NFR BLD AUTO: 2.4 % — HIGH (ref 0–2)
BASOPHILS NFR BLD AUTO: 2.4 % — HIGH (ref 0–2)
BILIRUB SERPL-MCNC: 0.2 MG/DL — SIGNIFICANT CHANGE UP (ref 0.2–1.2)
BILIRUB SERPL-MCNC: 0.2 MG/DL — SIGNIFICANT CHANGE UP (ref 0.2–1.2)
BUN SERPL-MCNC: 15 MG/DL — SIGNIFICANT CHANGE UP (ref 7–23)
BUN SERPL-MCNC: 15 MG/DL — SIGNIFICANT CHANGE UP (ref 7–23)
CALCIUM SERPL-MCNC: 8.6 MG/DL — SIGNIFICANT CHANGE UP (ref 8.4–10.5)
CALCIUM SERPL-MCNC: 8.6 MG/DL — SIGNIFICANT CHANGE UP (ref 8.4–10.5)
CHLORIDE SERPL-SCNC: 106 MMOL/L — SIGNIFICANT CHANGE UP (ref 96–108)
CHLORIDE SERPL-SCNC: 106 MMOL/L — SIGNIFICANT CHANGE UP (ref 96–108)
CO2 SERPL-SCNC: 30 MMOL/L — SIGNIFICANT CHANGE UP (ref 22–31)
CO2 SERPL-SCNC: 30 MMOL/L — SIGNIFICANT CHANGE UP (ref 22–31)
CREAT SERPL-MCNC: 0.6 MG/DL — SIGNIFICANT CHANGE UP (ref 0.5–1.3)
CREAT SERPL-MCNC: 0.6 MG/DL — SIGNIFICANT CHANGE UP (ref 0.5–1.3)
EGFR: 103 ML/MIN/1.73M2 — SIGNIFICANT CHANGE UP
EGFR: 103 ML/MIN/1.73M2 — SIGNIFICANT CHANGE UP
EOSINOPHIL # BLD AUTO: 1.17 K/UL — HIGH (ref 0–0.5)
EOSINOPHIL # BLD AUTO: 1.17 K/UL — HIGH (ref 0–0.5)
EOSINOPHIL NFR BLD AUTO: 20.2 % — HIGH (ref 0–6)
EOSINOPHIL NFR BLD AUTO: 20.2 % — HIGH (ref 0–6)
GLUCOSE SERPL-MCNC: 99 MG/DL — SIGNIFICANT CHANGE UP (ref 70–99)
GLUCOSE SERPL-MCNC: 99 MG/DL — SIGNIFICANT CHANGE UP (ref 70–99)
HCT VFR BLD CALC: 32.8 % — LOW (ref 34.5–45)
HCT VFR BLD CALC: 32.8 % — LOW (ref 34.5–45)
HCV AB S/CO SERPL IA: 0.12 S/CO — SIGNIFICANT CHANGE UP (ref 0–0.99)
HCV AB S/CO SERPL IA: 0.12 S/CO — SIGNIFICANT CHANGE UP (ref 0–0.99)
HCV AB SERPL-IMP: SIGNIFICANT CHANGE UP
HCV AB SERPL-IMP: SIGNIFICANT CHANGE UP
HGB BLD-MCNC: 10.3 G/DL — LOW (ref 11.5–15.5)
HGB BLD-MCNC: 10.3 G/DL — LOW (ref 11.5–15.5)
IMM GRANULOCYTES NFR BLD AUTO: 0.3 % — SIGNIFICANT CHANGE UP (ref 0–0.9)
IMM GRANULOCYTES NFR BLD AUTO: 0.3 % — SIGNIFICANT CHANGE UP (ref 0–0.9)
LYMPHOCYTES # BLD AUTO: 2.13 K/UL — SIGNIFICANT CHANGE UP (ref 1–3.3)
LYMPHOCYTES # BLD AUTO: 2.13 K/UL — SIGNIFICANT CHANGE UP (ref 1–3.3)
LYMPHOCYTES # BLD AUTO: 36.7 % — SIGNIFICANT CHANGE UP (ref 13–44)
LYMPHOCYTES # BLD AUTO: 36.7 % — SIGNIFICANT CHANGE UP (ref 13–44)
MCHC RBC-ENTMCNC: 27.5 PG — SIGNIFICANT CHANGE UP (ref 27–34)
MCHC RBC-ENTMCNC: 27.5 PG — SIGNIFICANT CHANGE UP (ref 27–34)
MCHC RBC-ENTMCNC: 31.4 GM/DL — LOW (ref 32–36)
MCHC RBC-ENTMCNC: 31.4 GM/DL — LOW (ref 32–36)
MCV RBC AUTO: 87.5 FL — SIGNIFICANT CHANGE UP (ref 80–100)
MCV RBC AUTO: 87.5 FL — SIGNIFICANT CHANGE UP (ref 80–100)
MONOCYTES # BLD AUTO: 0.45 K/UL — SIGNIFICANT CHANGE UP (ref 0–0.9)
MONOCYTES # BLD AUTO: 0.45 K/UL — SIGNIFICANT CHANGE UP (ref 0–0.9)
MONOCYTES NFR BLD AUTO: 7.8 % — SIGNIFICANT CHANGE UP (ref 2–14)
MONOCYTES NFR BLD AUTO: 7.8 % — SIGNIFICANT CHANGE UP (ref 2–14)
NEUTROPHILS # BLD AUTO: 1.89 K/UL — SIGNIFICANT CHANGE UP (ref 1.8–7.4)
NEUTROPHILS # BLD AUTO: 1.89 K/UL — SIGNIFICANT CHANGE UP (ref 1.8–7.4)
NEUTROPHILS NFR BLD AUTO: 32.6 % — LOW (ref 43–77)
NEUTROPHILS NFR BLD AUTO: 32.6 % — LOW (ref 43–77)
NRBC # BLD: 0 /100 WBCS — SIGNIFICANT CHANGE UP (ref 0–0)
NRBC # BLD: 0 /100 WBCS — SIGNIFICANT CHANGE UP (ref 0–0)
PLATELET # BLD AUTO: 339 K/UL — SIGNIFICANT CHANGE UP (ref 150–400)
PLATELET # BLD AUTO: 339 K/UL — SIGNIFICANT CHANGE UP (ref 150–400)
POTASSIUM SERPL-MCNC: 4.2 MMOL/L — SIGNIFICANT CHANGE UP (ref 3.5–5.3)
POTASSIUM SERPL-MCNC: 4.2 MMOL/L — SIGNIFICANT CHANGE UP (ref 3.5–5.3)
POTASSIUM SERPL-SCNC: 4.2 MMOL/L — SIGNIFICANT CHANGE UP (ref 3.5–5.3)
POTASSIUM SERPL-SCNC: 4.2 MMOL/L — SIGNIFICANT CHANGE UP (ref 3.5–5.3)
PROT SERPL-MCNC: 5.7 G/DL — LOW (ref 6–8.3)
PROT SERPL-MCNC: 5.7 G/DL — LOW (ref 6–8.3)
RBC # BLD: 3.75 M/UL — LOW (ref 3.8–5.2)
RBC # BLD: 3.75 M/UL — LOW (ref 3.8–5.2)
RBC # FLD: 14.2 % — SIGNIFICANT CHANGE UP (ref 10.3–14.5)
RBC # FLD: 14.2 % — SIGNIFICANT CHANGE UP (ref 10.3–14.5)
SODIUM SERPL-SCNC: 141 MMOL/L — SIGNIFICANT CHANGE UP (ref 135–145)
SODIUM SERPL-SCNC: 141 MMOL/L — SIGNIFICANT CHANGE UP (ref 135–145)
WBC # BLD: 5.8 K/UL — SIGNIFICANT CHANGE UP (ref 3.8–10.5)
WBC # BLD: 5.8 K/UL — SIGNIFICANT CHANGE UP (ref 3.8–10.5)
WBC # FLD AUTO: 5.8 K/UL — SIGNIFICANT CHANGE UP (ref 3.8–10.5)
WBC # FLD AUTO: 5.8 K/UL — SIGNIFICANT CHANGE UP (ref 3.8–10.5)

## 2023-11-01 PROCEDURE — 99222 1ST HOSP IP/OBS MODERATE 55: CPT

## 2023-11-01 RX ADMIN — Medication 500 MILLIGRAM(S): at 11:33

## 2023-11-01 RX ADMIN — Medication 500 MILLIGRAM(S): at 17:47

## 2023-11-01 RX ADMIN — Medication 975 MILLIGRAM(S): at 06:58

## 2023-11-01 RX ADMIN — GABAPENTIN 100 MILLIGRAM(S): 400 CAPSULE ORAL at 21:11

## 2023-11-01 RX ADMIN — Medication 500 MILLIGRAM(S): at 06:58

## 2023-11-01 RX ADMIN — MORPHINE SULFATE 30 MILLIGRAM(S): 50 CAPSULE, EXTENDED RELEASE ORAL at 11:32

## 2023-11-01 RX ADMIN — MORPHINE SULFATE 30 MILLIGRAM(S): 50 CAPSULE, EXTENDED RELEASE ORAL at 22:01

## 2023-11-01 RX ADMIN — ENOXAPARIN SODIUM 40 MILLIGRAM(S): 100 INJECTION SUBCUTANEOUS at 06:58

## 2023-11-01 RX ADMIN — MORPHINE SULFATE 30 MILLIGRAM(S): 50 CAPSULE, EXTENDED RELEASE ORAL at 18:45

## 2023-11-01 RX ADMIN — MORPHINE SULFATE 30 MILLIGRAM(S): 50 CAPSULE, EXTENDED RELEASE ORAL at 02:50

## 2023-11-01 RX ADMIN — Medication 975 MILLIGRAM(S): at 14:37

## 2023-11-01 RX ADMIN — SENNA PLUS 2 TABLET(S): 8.6 TABLET ORAL at 21:11

## 2023-11-01 RX ADMIN — MORPHINE SULFATE 30 MILLIGRAM(S): 50 CAPSULE, EXTENDED RELEASE ORAL at 02:26

## 2023-11-01 RX ADMIN — Medication 975 MILLIGRAM(S): at 21:12

## 2023-11-01 RX ADMIN — Medication 500 MILLIGRAM(S): at 00:32

## 2023-11-01 RX ADMIN — GABAPENTIN 100 MILLIGRAM(S): 400 CAPSULE ORAL at 06:58

## 2023-11-01 RX ADMIN — GABAPENTIN 100 MILLIGRAM(S): 400 CAPSULE ORAL at 14:37

## 2023-11-01 RX ADMIN — Medication 975 MILLIGRAM(S): at 15:30

## 2023-11-01 RX ADMIN — MORPHINE SULFATE 30 MILLIGRAM(S): 50 CAPSULE, EXTENDED RELEASE ORAL at 06:58

## 2023-11-01 RX ADMIN — MORPHINE SULFATE 30 MILLIGRAM(S): 50 CAPSULE, EXTENDED RELEASE ORAL at 17:47

## 2023-11-01 RX ADMIN — MORPHINE SULFATE 30 MILLIGRAM(S): 50 CAPSULE, EXTENDED RELEASE ORAL at 12:30

## 2023-11-01 RX ADMIN — Medication 5 MILLIGRAM(S): at 21:11

## 2023-11-01 RX ADMIN — POLYETHYLENE GLYCOL 3350 17 GRAM(S): 17 POWDER, FOR SOLUTION ORAL at 11:33

## 2023-11-01 NOTE — CONSULT NOTE ADULT - SUBJECTIVE AND OBJECTIVE BOX
HPI:  58 y.o [place] female PMH GERD (s/p Nissen fundoplication x3), hiatal and paraesophageal hernia (laparoscopic paraesophageal hernia repair) , bursitis, b/l chronic knee pain, synovial chondromatosis, partial paralysis of vocal cords,  myomectomy, sinus surgery, , ear surgery, right shoulder surgery x6 presented to [facility] on 10/18 for chondrosarcoma of the right proximal humerus and is s/p composite resection of the tumor including axillary nerve, lateral deltoid, humeral head and total shoulder arthroplasty with allograft and Latissmus dorsus rotational flap. Right UE is NWB throughout with no R-arm abduction past 90 degrees. JAIMIE drain remains upon discharge to rehab and should be stripped at least twice a day by rehab staff. Total output number should be recorded and uploaded to patient's portal. Output must be less than 30cc for 2 days before removal. Patient was evaluated by PM&R and therapy for functional deficits, gait/ADL impairments and acute rehabilitation was recommended. Patient was medically optimized for discharge to [facility] IRU on 10/31.          (31 Oct 2023 15:19)      PAST MEDICAL & SURGICAL HISTORY:  Chondrosarcoma      Chronic GERD      Osteoarthritis of both knees      [place] (hard of hearing)      Hiatal hernia      Synovial chondromatosis      H/O  section      S/P repair of paraesophageal hernia      History of Nissen fundoplication      H/O myomectomy          CONSTITUTIONAL: No weakness, fevers or chills  EYES/ENT: No visual changes;  No vertigo or throat pain   NECK: No pain or stiffness  RESPIRATORY: No cough, wheezing, hemoptysis; No shortness of breath  CARDIOVASCULAR: No chest pain or palpitations  GASTROINTESTINAL: No abdominal or epigastric pain. No nausea, vomiting, or hematemesis; No diarrhea or constipation. No melena or hematochezia.  GENITOURINARY: No dysuria, frequency or hematuria  NEUROLOGICAL: No numbness or weakness  SKIN: No itching, burning, rashes, or lesions   All other review of systems is negative unless indicated above.    MEDICATIONS  (STANDING):  acetaminophen     Tablet .. 975 milliGRAM(s) Oral every 8 hours  bisacodyl 5 milliGRAM(s) Oral at bedtime  cephalexin 500 milliGRAM(s) Oral four times a day  enoxaparin Injectable 40 milliGRAM(s) SubCutaneous every 24 hours  gabapentin 100 milliGRAM(s) Oral three times a day  influenza   Vaccine 0.5 milliLiter(s) IntraMuscular once  polyethylene glycol 3350 17 Gram(s) Oral daily  senna 2 Tablet(s) Oral at bedtime    MEDICATIONS  (PRN):  diphenhydrAMINE 25 milliGRAM(s) Oral every 6 hours PRN Rash and/or Itching  melatonin 6 milliGRAM(s) Oral at bedtime PRN Insomnia  morphine   Solution 15 milliGRAM(s) Oral every 4 hours PRN Moderate Pain (4 - 6)  morphine   Solution 30 milliGRAM(s) Oral every 4 hours PRN Severe Pain (7 - 10)  ondansetron   Disintegrating Tablet 4 milliGRAM(s) Oral every 6 hours PRN Nausea and/or Vomiting      Allergies    No Known Allergies    Intolerances        SOCIAL HISTORY:  Tobacco   Alcohol  Drugs    FAMILY HISTORY:      Vital Signs Last 24 Hrs  T(C): 36.6 (2023 08:02), Max: 36.9 (31 Oct 2023 18:30)  T(F): 97.9 (2023 08:02), Max: 98.5 (31 Oct 2023 18:30)  HR: 66 (2023 08:02) (66 - 74)  BP: 130/81 (2023 08:02) (130/76 - 139/80)  BP(mean): --  RR: 16 (2023 08:02) (15 - 17)  SpO2: 94% (2023 08:02) (94% - 96%)    Parameters below as of 2023 08:02  Patient On (Oxygen Delivery Method): room air        General: WN/WD NAD  Neurology: A&Ox3, nonfocal, PARR x 4  Respiratory: CTA B/L  CV: RRR, S1S2, no murmurs, rubs or gallops  Abdominal: Soft, NT, ND +BS, Last BM  Extremities: No edema, + peripheral pulses  Incisions:    LABS:                        10.3   5.80  )-----------( 339      ( 2023 05:32 )             32.8     11-    141  |  106  |  15  ----------------------------<  99  4.2   |  30  |  0.60    Ca    8.6      2023 05:32    TPro  5.7<L>  /  Alb  2.5<L>  /  TBili  0.2  /  DBili  x   /  AST  15  /  ALT  14  /  AlkPhos  67  11-      Urinalysis Basic - ( 2023 05:32 )    Color: x / Appearance: x / SG: x / pH: x  Gluc: 99 mg/dL / Ketone: x  / Bili: x / Urobili: x   Blood: x / Protein: x / Nitrite: x   Leuk Esterase: x / RBC: x / WBC x   Sq Epi: x / Non Sq Epi: x / Bacteria: x        RADIOLOGY & ADDITIONAL STUDIES: HPI:  58 y.o [place] female PMH GERD (s/p Nissen fundoplication x3), hiatal and paraesophageal hernia (laparoscopic paraesophageal hernia repair) , bursitis, b/l chronic knee pain, synovial chondromatosis, partial paralysis of vocal cords,  myomectomy, sinus surgery, , ear surgery, right shoulder surgery x6 presented to [facility] on 10/18 for chondrosarcoma of the right proximal humerus and is s/p composite resection of the tumor including axillary nerve, lateral deltoid, humeral head and total shoulder arthroplasty with allograft and Latissmus dorsus rotational flap. Right UE is NWB throughout with no R-arm abduction past 90 degrees. JAIMIE drain remains upon discharge to rehab and should be stripped at least twice a day by rehab staff. Total output number should be recorded and uploaded to patient's portal. Output must be less than 30cc for 2 days before removal. Patient was evaluated by PM&R and therapy for functional deficits, gait/ADL impairments and acute rehabilitation was recommended. Patient was medically optimized for discharge to [facility] IRU on 10/31.          (31 Oct 2023 15:19)      PAST MEDICAL & SURGICAL HISTORY:  Chondrosarcoma      Chronic GERD      Osteoarthritis of both knees      [place] (hard of hearing)      Hiatal hernia      Synovial chondromatosis      H/O  section      S/P repair of paraesophageal hernia in [facility] dr Rain      History of Nissen fundoplication      H/O myomectomy          CONSTITUTIONAL: No weakness, fevers or chills  EYES/ENT: No visual changes;  No vertigo or throat pain   NECK: No pain or stiffness  RESPIRATORY: No cough, wheezing, hemoptysis; No shortness of breath  CARDIOVASCULAR: No chest pain or palpitations  GASTROINTESTINAL: No abdominal or epigastric pain. No nausea, vomiting, or hematemesis; No diarrhea or constipation. No melena or hematochezia.  GENITOURINARY: No dysuria, frequency or hematuria  NEUROLOGICAL: No numbness or weakness except RUE  SKIN: No itching, burning, rashes, or lesions   All other review of systems is negative unless indicated above.    MEDICATIONS  (STANDING):  acetaminophen     Tablet .. 975 milliGRAM(s) Oral every 8 hours  bisacodyl 5 milliGRAM(s) Oral at bedtime  cephalexin 500 milliGRAM(s) Oral four times a day  enoxaparin Injectable 40 milliGRAM(s) SubCutaneous every 24 hours  gabapentin 100 milliGRAM(s) Oral three times a day  influenza   Vaccine 0.5 milliLiter(s) IntraMuscular once  polyethylene glycol 3350 17 Gram(s) Oral daily  senna 2 Tablet(s) Oral at bedtime    MEDICATIONS  (PRN):  diphenhydrAMINE 25 milliGRAM(s) Oral every 6 hours PRN Rash and/or Itching  melatonin 6 milliGRAM(s) Oral at bedtime PRN Insomnia  morphine   Solution 15 milliGRAM(s) Oral every 4 hours PRN Moderate Pain (4 - 6)  morphine   Solution 30 milliGRAM(s) Oral every 4 hours PRN Severe Pain (7 - 10)  ondansetron   Disintegrating Tablet 4 milliGRAM(s) Oral every 6 hours PRN Nausea and/or Vomiting      Allergies    No Known Allergies    Intolerances        SOCIAL HISTORY:  , single, lives in a house  Tobacco denies  Alcohol denies  Drugs denies    FAMILY HISTORY: aunt with hiatal hernia          Vital Signs Last 24 Hrs  T(C): 36.6 (2023 08:02), Max: 36.9 (31 Oct 2023 18:30)  T(F): 97.9 (2023 08:02), Max: 98.5 (31 Oct 2023 18:30)  HR: 66 (2023 08:02) (66 - 74)  BP: 130/81 (2023 08:02) (130/76 - 139/80)  BP(mean): --  RR: 16 (2023 08:02) (15 - 17)  SpO2: 94% (2023 08:02) (94% - 96%)    Parameters below as of 2023 08:02  Patient On (Oxygen Delivery Method): room air      Physical Exam:     Gen - NAD, Comfortable  HEENT - NCAT, EOMI, MMM  Neck - Supple, No limited ROM  Pulm - CTAB, No wheeze, No rhonchi, No crackles  Cardiovascular - RRR, S1S2, No murmurs  Abdomen - Soft, NT/ND, +BS  Extremities - No C/C/E, No calf tenderness  Neuro-aaox3, wnl except RIGHT UE - unable to fully assess. Patient able to extend and flex fingers and wrist within immobilizer                         Psychiatric - Mood stable, Affect WNL  Skin:  large incisional sites with surgical staples across the biceps, right deltoid, right shoulder as well as across the back with a JAIMIE drain. No erythema, purulence, or fluctuance around incisional sites or JAIMIE drain insertion.  Wounds: None Present except as above    LABS:                        10.3   5.80  )-----------( 339      ( 2023 05:32 )             32.8     11    141  |  106  |  15  ----------------------------<  99  4.2   |  30  |  0.60    Ca    8.6      2023 05:32    TPro  5.7<L>  /  Alb  2.5<L>  /  TBili  0.2  /  DBili  x   /  AST  15  /  ALT  14  /  AlkPhos  67  11-      Urinalysis Basic - ( 2023 05:32 )    Color: x / Appearance: x / SG: x / pH: x  Gluc: 99 mg/dL / Ketone: x  / Bili: x / Urobili: x   Blood: x / Protein: x / Nitrite: x   Leuk Esterase: x / RBC: x / WBC x   Sq Epi: x / Non Sq Epi: x / Bacteria: x        RADIOLOGY & ADDITIONAL STUDIES:

## 2023-11-01 NOTE — DIETITIAN INITIAL EVALUATION ADULT - ADD RECOMMEND
1. Continue Regular diet w/ prioritization of protein.   2. Recommend Multivitamin, to ensure 100% RDA met   3. Monitor PO intake, GI tolerance, skin integrity, labs, weight, and bowel movement regularity.   4. Honor food preferences as feasible. Assist with meals PRN and encourage PO intake.  5. Provide ongoing diet education as needed  6. RD remains available upon request and will follow-up per protocol

## 2023-11-01 NOTE — DIETITIAN INITIAL EVALUATION ADULT - OTHER INFO
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 Danielle Mathew is a 58 year old right handed female patient with past medical history of being [place], GERD (s/p Nissen fundoplication x3), hiatal and paraesophageal hernia (laparoscopic paraesophageal hernia repair) , bursitis, b/l chronic knee pain, synovial chondromatosis, partial paralysis of vocal cords, myomectomy, sinus surgery, , ear surgery, and right shoulder surgery x6 who [place] [facility] surgical management of a chondrosarcoma of the right proximal humerus and is currently s/p composite resection of the tumor including axillary nerve, lateral deltoid, humeral head and total shoulder arthroplasty with allograft and latissimus dorsi rotational flap by Dr. Nahid Du. Right UE is NWB throughout with no shoulder ROM to be attempted. Per discussion with surgeon, patient may perform right wrist and right elbow active and active assisted flexion and extension as tolerated while maintaining shoulder precautions. JAIMIE drain remains upon discharge to rehab and should be stripped at least twice a day by rehab staff. Total output number should be recorded and uploaded to patient's portal. Output must be less than 30cc for 2 days before removal. Patient was evaluated by PM&R and therapy for functional deficits, gait/ADL impairments and acute rehabilitation was recommended. Patient was medically optimized for discharge to [facility] IRF on 10/31.     At this time patient tolerating diet w/ adequate appetite/intake. No issues w/ dentition or chewing and swallowing current diet texture. Reports nausea this AM which is resolving now. W/ constipation last BM 10/31 Per nursing flowsheets. Encouraged fluid/fiber at meals to assist w/ regular BM, agreed to prunes at breakfast meal. UBW 150lbs, CBW 177lb 10/31.

## 2023-11-01 NOTE — DIETITIAN INITIAL EVALUATION ADULT - NSFNSPHYEXAMSKINFT_GEN_A_CORE
multiple Surgical Incision, Site mid back to right flank, right shoulder, right shoulder extending to forearm, Site: old JAIMIE site right AC

## 2023-11-01 NOTE — CONSULT NOTE ADULT - ASSESSMENT
IVAN REY is a 58y [place] female with GERD (s/p Nissen fundoplication x3), hiatal and paraesophageal hernia (laparoscopic paraesophageal hernia repair) , bursitis, b/l chronic knee pain, synovial chondromatosis, partial paralysis of vocal cords,  myomectomy, sinus surgery, , ear surgery, right shoulder surgery x6 presented to [facility] for R humerus bone lesion ccb increased pain and limited movement. 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 She is now s/p wide local resection of R shoulder chondrosarcoma & reconstruction with Allograft, Latissmus dorsus rotational flap on 10/18. Patient now admitted for a multidisciplinary rehab program. 10-31-23.     Pt is medically optimized to undergo comprehensive rehab program as delineated below with editions:      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 and total shoulder arthroplasty reconstruction with allograft and latissmus dorsus rotational flap on 10/18  - X-ray 2023 shows unchanged mass extending from the right humeral head into the proximal metadiaphysis with an extraosseous soft tissue component consistent with chondrosarcoma  - x-ray 10/18 - right shoulder arthroplasty  - NWB RUE with no R-arm abduction past 90 degrees  - monitor JAIMIE drain. Should be stripped at least twice a day by rehab staff. Total output number should be recorded and uploaded to patient's portal. Output must be less than 30cc for 2 days before removal.   - Comprehensive Multidisciplinary Rehab Program: Start comprehensive rehab program of PT/OT - 3 hours a day, 5 days a week.  - pain control as below  - f/u plastics outpatient.   - cephalexin 500mg q6    Pain  - Tylenol 975mg q8  - Morphine 15mg and 30mg PO. q4h  - gabapentin 100mg TID    nausea  -zofran, if multiple doses given check ekg for QTc monitoring    Mood / Cognition  - Neuropsychology consult would likely be of benefit given anxieties and processing of diagnosis as well as change in functional status from independence     Sleep  - Melatonin 3mg PRN     GI / Bowel  - LBM 10/29  - Senna qHS  - Miralax Daily  - GI ppx: none     / Bladder  - Continue bladder scans Q8 hours with straight cath for >400cc.  - Toileting schedule every 4 hours    Skin / Pressure injury  - Skin assessment on admission performed [X] :   - Monitor Incisions:  multiple incisional sites across the RUE from the shoulder to the elbow. Clean, dry, intact. No erythema, drainage, or fluctuance.  - JAIMIE drain from back draining serosanguinous fluid    Diet/Dysphagia:  - Diet Consistency: regular  - Supplements: none  - Aspiration Precautions  - SLP consult for swallow function evaluation and treatment given history of vocal cord paralysis   - Nutrition consult    DVT prophylaxis:   - lovneox 40  - SCDs    Outpatient Follow-up:  Meghaan Donohue MD [facility] plastic surgeon, [phone]  Nahid Palomo [phone]    ---------------    Maintaining R UE NWB throughout.   Right shoulder sling: no R arm abduction past 90 degrees     10/30/2023 Orthopedic note: JAIMIE drain back   JAIMIE drain care in rehab. Per plastics team, JAIMIE drain to remain upon discharge to rehab. JAIMIE drain should be stripped at least twice a day by rehab staff. Total output number should be recorded and uploaded to patient's portal. Once patient has been discharged from rehab she will see Dr. Srinivasan in her outpatient follow up visit where drain will be removed in clinic. JAIMIE drain output must be less than 30ccs x 2 days before removal.

## 2023-11-01 NOTE — DIETITIAN INITIAL EVALUATION ADULT - PERTINENT MEDS FT
MEDICATIONS  (STANDING):  acetaminophen     Tablet .. 975 milliGRAM(s) Oral every 8 hours  bisacodyl 5 milliGRAM(s) Oral at bedtime  cephalexin 500 milliGRAM(s) Oral four times a day  enoxaparin Injectable 40 milliGRAM(s) SubCutaneous every 24 hours  gabapentin 100 milliGRAM(s) Oral three times a day  influenza   Vaccine 0.5 milliLiter(s) IntraMuscular once  polyethylene glycol 3350 17 Gram(s) Oral daily  senna 2 Tablet(s) Oral at bedtime    MEDICATIONS  (PRN):  diphenhydrAMINE 25 milliGRAM(s) Oral every 6 hours PRN Rash and/or Itching  melatonin 6 milliGRAM(s) Oral at bedtime PRN Insomnia  morphine   Solution 15 milliGRAM(s) Oral every 4 hours PRN Moderate Pain (4 - 6)  morphine   Solution 30 milliGRAM(s) Oral every 4 hours PRN Severe Pain (7 - 10)  ondansetron   Disintegrating Tablet 4 milliGRAM(s) Oral every 6 hours PRN Nausea and/or Vomiting

## 2023-11-01 NOTE — DIETITIAN INITIAL EVALUATION ADULT - ORAL INTAKE PTA/DIET HISTORY
Pt endorses good appetite and PO intake PTA at previous hospital admission [facility] [place]. W/ hx of GERD avoids acidic foods (tomato sauce, tomato soup) NKFA nor food intolerances reported

## 2023-11-01 NOTE — DIETITIAN INITIAL EVALUATION ADULT - NSICDXPASTMEDICALHX_GEN_ALL_CORE_FT
PAST MEDICAL HISTORY:  Chondrosarcoma     Chronic GERD     Hiatal hernia     [place] (hard of hearing)     Osteoarthritis of both knees     Synovial chondromatosis

## 2023-11-01 NOTE — DIETITIAN INITIAL EVALUATION ADULT - PERTINENT LABORATORY DATA
11-01    141  |  106  |  15  ----------------------------<  99  4.2   |  30  |  0.60    Ca    8.6      01 Nov 2023 05:32    TPro  5.7<L>  /  Alb  2.5<L>  /  TBili  0.2  /  DBili  x   /  AST  15  /  ALT  14  /  AlkPhos  67  11-01

## 2023-11-02 LAB
ALBUMIN SERPL ELPH-MCNC: 2.8 G/DL — LOW (ref 3.3–5)
ALBUMIN SERPL ELPH-MCNC: 2.8 G/DL — LOW (ref 3.3–5)
ALP SERPL-CCNC: 68 U/L — SIGNIFICANT CHANGE UP (ref 40–120)
ALP SERPL-CCNC: 68 U/L — SIGNIFICANT CHANGE UP (ref 40–120)
ALT FLD-CCNC: 22 U/L — SIGNIFICANT CHANGE UP (ref 10–45)
ALT FLD-CCNC: 22 U/L — SIGNIFICANT CHANGE UP (ref 10–45)
ANION GAP SERPL CALC-SCNC: 4 MMOL/L — LOW (ref 5–17)
ANION GAP SERPL CALC-SCNC: 4 MMOL/L — LOW (ref 5–17)
AST SERPL-CCNC: 15 U/L — SIGNIFICANT CHANGE UP (ref 10–40)
AST SERPL-CCNC: 15 U/L — SIGNIFICANT CHANGE UP (ref 10–40)
BASOPHILS # BLD AUTO: 0.14 K/UL — SIGNIFICANT CHANGE UP (ref 0–0.2)
BASOPHILS # BLD AUTO: 0.14 K/UL — SIGNIFICANT CHANGE UP (ref 0–0.2)
BASOPHILS NFR BLD AUTO: 2.3 % — HIGH (ref 0–2)
BASOPHILS NFR BLD AUTO: 2.3 % — HIGH (ref 0–2)
BILIRUB SERPL-MCNC: 0.3 MG/DL — SIGNIFICANT CHANGE UP (ref 0.2–1.2)
BILIRUB SERPL-MCNC: 0.3 MG/DL — SIGNIFICANT CHANGE UP (ref 0.2–1.2)
BUN SERPL-MCNC: 20 MG/DL — SIGNIFICANT CHANGE UP (ref 7–23)
BUN SERPL-MCNC: 20 MG/DL — SIGNIFICANT CHANGE UP (ref 7–23)
CALCIUM SERPL-MCNC: 9 MG/DL — SIGNIFICANT CHANGE UP (ref 8.4–10.5)
CALCIUM SERPL-MCNC: 9 MG/DL — SIGNIFICANT CHANGE UP (ref 8.4–10.5)
CHLORIDE SERPL-SCNC: 107 MMOL/L — SIGNIFICANT CHANGE UP (ref 96–108)
CHLORIDE SERPL-SCNC: 107 MMOL/L — SIGNIFICANT CHANGE UP (ref 96–108)
CO2 SERPL-SCNC: 29 MMOL/L — SIGNIFICANT CHANGE UP (ref 22–31)
CO2 SERPL-SCNC: 29 MMOL/L — SIGNIFICANT CHANGE UP (ref 22–31)
CREAT SERPL-MCNC: 0.7 MG/DL — SIGNIFICANT CHANGE UP (ref 0.5–1.3)
CREAT SERPL-MCNC: 0.7 MG/DL — SIGNIFICANT CHANGE UP (ref 0.5–1.3)
EGFR: 99 ML/MIN/1.73M2 — SIGNIFICANT CHANGE UP
EGFR: 99 ML/MIN/1.73M2 — SIGNIFICANT CHANGE UP
EOSINOPHIL # BLD AUTO: 1.11 K/UL — HIGH (ref 0–0.5)
EOSINOPHIL # BLD AUTO: 1.11 K/UL — HIGH (ref 0–0.5)
EOSINOPHIL NFR BLD AUTO: 17.9 % — HIGH (ref 0–6)
EOSINOPHIL NFR BLD AUTO: 17.9 % — HIGH (ref 0–6)
GLUCOSE SERPL-MCNC: 102 MG/DL — HIGH (ref 70–99)
GLUCOSE SERPL-MCNC: 102 MG/DL — HIGH (ref 70–99)
HCT VFR BLD CALC: 33.5 % — LOW (ref 34.5–45)
HCT VFR BLD CALC: 33.5 % — LOW (ref 34.5–45)
HGB BLD-MCNC: 10.7 G/DL — LOW (ref 11.5–15.5)
HGB BLD-MCNC: 10.7 G/DL — LOW (ref 11.5–15.5)
IMM GRANULOCYTES NFR BLD AUTO: 0.3 % — SIGNIFICANT CHANGE UP (ref 0–0.9)
IMM GRANULOCYTES NFR BLD AUTO: 0.3 % — SIGNIFICANT CHANGE UP (ref 0–0.9)
LYMPHOCYTES # BLD AUTO: 2.19 K/UL — SIGNIFICANT CHANGE UP (ref 1–3.3)
LYMPHOCYTES # BLD AUTO: 2.19 K/UL — SIGNIFICANT CHANGE UP (ref 1–3.3)
LYMPHOCYTES # BLD AUTO: 35.3 % — SIGNIFICANT CHANGE UP (ref 13–44)
LYMPHOCYTES # BLD AUTO: 35.3 % — SIGNIFICANT CHANGE UP (ref 13–44)
MCHC RBC-ENTMCNC: 27.6 PG — SIGNIFICANT CHANGE UP (ref 27–34)
MCHC RBC-ENTMCNC: 27.6 PG — SIGNIFICANT CHANGE UP (ref 27–34)
MCHC RBC-ENTMCNC: 31.9 GM/DL — LOW (ref 32–36)
MCHC RBC-ENTMCNC: 31.9 GM/DL — LOW (ref 32–36)
MCV RBC AUTO: 86.6 FL — SIGNIFICANT CHANGE UP (ref 80–100)
MCV RBC AUTO: 86.6 FL — SIGNIFICANT CHANGE UP (ref 80–100)
MONOCYTES # BLD AUTO: 0.39 K/UL — SIGNIFICANT CHANGE UP (ref 0–0.9)
MONOCYTES # BLD AUTO: 0.39 K/UL — SIGNIFICANT CHANGE UP (ref 0–0.9)
MONOCYTES NFR BLD AUTO: 6.3 % — SIGNIFICANT CHANGE UP (ref 2–14)
MONOCYTES NFR BLD AUTO: 6.3 % — SIGNIFICANT CHANGE UP (ref 2–14)
NEUTROPHILS # BLD AUTO: 2.36 K/UL — SIGNIFICANT CHANGE UP (ref 1.8–7.4)
NEUTROPHILS # BLD AUTO: 2.36 K/UL — SIGNIFICANT CHANGE UP (ref 1.8–7.4)
NEUTROPHILS NFR BLD AUTO: 37.9 % — LOW (ref 43–77)
NEUTROPHILS NFR BLD AUTO: 37.9 % — LOW (ref 43–77)
NRBC # BLD: 0 /100 WBCS — SIGNIFICANT CHANGE UP (ref 0–0)
NRBC # BLD: 0 /100 WBCS — SIGNIFICANT CHANGE UP (ref 0–0)
PLATELET # BLD AUTO: 357 K/UL — SIGNIFICANT CHANGE UP (ref 150–400)
PLATELET # BLD AUTO: 357 K/UL — SIGNIFICANT CHANGE UP (ref 150–400)
POTASSIUM SERPL-MCNC: 4.5 MMOL/L — SIGNIFICANT CHANGE UP (ref 3.5–5.3)
POTASSIUM SERPL-MCNC: 4.5 MMOL/L — SIGNIFICANT CHANGE UP (ref 3.5–5.3)
POTASSIUM SERPL-SCNC: 4.5 MMOL/L — SIGNIFICANT CHANGE UP (ref 3.5–5.3)
POTASSIUM SERPL-SCNC: 4.5 MMOL/L — SIGNIFICANT CHANGE UP (ref 3.5–5.3)
PROT SERPL-MCNC: 5.9 G/DL — LOW (ref 6–8.3)
PROT SERPL-MCNC: 5.9 G/DL — LOW (ref 6–8.3)
RBC # BLD: 3.87 M/UL — SIGNIFICANT CHANGE UP (ref 3.8–5.2)
RBC # BLD: 3.87 M/UL — SIGNIFICANT CHANGE UP (ref 3.8–5.2)
RBC # FLD: 14 % — SIGNIFICANT CHANGE UP (ref 10.3–14.5)
RBC # FLD: 14 % — SIGNIFICANT CHANGE UP (ref 10.3–14.5)
SODIUM SERPL-SCNC: 140 MMOL/L — SIGNIFICANT CHANGE UP (ref 135–145)
SODIUM SERPL-SCNC: 140 MMOL/L — SIGNIFICANT CHANGE UP (ref 135–145)
WBC # BLD: 6.21 K/UL — SIGNIFICANT CHANGE UP (ref 3.8–10.5)
WBC # BLD: 6.21 K/UL — SIGNIFICANT CHANGE UP (ref 3.8–10.5)
WBC # FLD AUTO: 6.21 K/UL — SIGNIFICANT CHANGE UP (ref 3.8–10.5)
WBC # FLD AUTO: 6.21 K/UL — SIGNIFICANT CHANGE UP (ref 3.8–10.5)

## 2023-11-02 RX ADMIN — MORPHINE SULFATE 30 MILLIGRAM(S): 50 CAPSULE, EXTENDED RELEASE ORAL at 11:00

## 2023-11-02 RX ADMIN — MORPHINE SULFATE 30 MILLIGRAM(S): 50 CAPSULE, EXTENDED RELEASE ORAL at 10:03

## 2023-11-02 RX ADMIN — Medication 500 MILLIGRAM(S): at 12:48

## 2023-11-02 RX ADMIN — Medication 975 MILLIGRAM(S): at 22:25

## 2023-11-02 RX ADMIN — SENNA PLUS 2 TABLET(S): 8.6 TABLET ORAL at 22:25

## 2023-11-02 RX ADMIN — GABAPENTIN 100 MILLIGRAM(S): 400 CAPSULE ORAL at 06:16

## 2023-11-02 RX ADMIN — GABAPENTIN 100 MILLIGRAM(S): 400 CAPSULE ORAL at 22:25

## 2023-11-02 RX ADMIN — MORPHINE SULFATE 30 MILLIGRAM(S): 50 CAPSULE, EXTENDED RELEASE ORAL at 16:40

## 2023-11-02 RX ADMIN — ENOXAPARIN SODIUM 40 MILLIGRAM(S): 100 INJECTION SUBCUTANEOUS at 06:17

## 2023-11-02 RX ADMIN — MORPHINE SULFATE 30 MILLIGRAM(S): 50 CAPSULE, EXTENDED RELEASE ORAL at 03:10

## 2023-11-02 RX ADMIN — MORPHINE SULFATE 30 MILLIGRAM(S): 50 CAPSULE, EXTENDED RELEASE ORAL at 20:17

## 2023-11-02 RX ADMIN — Medication 500 MILLIGRAM(S): at 00:10

## 2023-11-02 RX ADMIN — Medication 975 MILLIGRAM(S): at 13:25

## 2023-11-02 RX ADMIN — Medication 500 MILLIGRAM(S): at 17:50

## 2023-11-02 RX ADMIN — Medication 5 MILLIGRAM(S): at 22:25

## 2023-11-02 RX ADMIN — POLYETHYLENE GLYCOL 3350 17 GRAM(S): 17 POWDER, FOR SOLUTION ORAL at 12:48

## 2023-11-02 RX ADMIN — Medication 500 MILLIGRAM(S): at 06:17

## 2023-11-02 RX ADMIN — Medication 975 MILLIGRAM(S): at 06:16

## 2023-11-02 RX ADMIN — MORPHINE SULFATE 30 MILLIGRAM(S): 50 CAPSULE, EXTENDED RELEASE ORAL at 15:41

## 2023-11-02 RX ADMIN — Medication 975 MILLIGRAM(S): at 14:20

## 2023-11-02 RX ADMIN — GABAPENTIN 100 MILLIGRAM(S): 400 CAPSULE ORAL at 13:25

## 2023-11-02 NOTE — PROGRESS NOTE ADULT - SUBJECTIVE AND OBJECTIVE BOX
HPI:  Mrs. Danielle Mathew is a 58 year old right handed female patient with past medical history of being [place], GERD (s/p Nissen fundoplication x3), hiatal and paraesophageal hernia (laparoscopic paraesophageal hernia repair) , bursitis, b/l chronic knee pain, synovial chondromatosis, partial paralysis of vocal cords, myomectomy, sinus surgery, , ear surgery, and right shoulder surgery x6 who presented to [facility] [place] [place] on 10/18 for surgical management of a chondrosarcoma of the right proximal humerus and is currently s/p composite resection of the tumor including axillary nerve, lateral deltoid, humeral head and total shoulder arthroplasty with allograft and latissimus dorsi rotational flap by Dr. Nahid Du. Right UE is NWB throughout with no shoulder ROM to be attempted. Per discussion with surgeon, patient may perform right wrist and right elbow active and active assisted flexion and extension as tolerated while maintaining shoulder precautions. JAIMIE drain remains upon discharge to rehab and should be stripped at least twice a day by rehab staff. Total output number should be recorded and uploaded to patient's portal. Output must be less than 30cc for 2 days before removal. Patient was evaluated by PM&R and therapy for functional deficits, gait/ADL impairments and acute rehabilitation was recommended. Patient was medically optimized for discharge to [facility] IRF on 10/31.  (31 Oct 2023 15:19)      ___________________________________________________________________________    SUBJECTIVE/ROS  Patient was seen and evaluated at bedside today.  Reported no overnight events and is in no acute distress.  Case to be discussed at Interdisciplinary Team meeting today.  A tentative discharge date to be outlined above.  Patient is eager to continue participation on the recommended rehabilitation program.  Eager to participate on the recommended rehabilitation program.  Denies any CP, SOB, LEOS, palpitations, fever, chills, body aches, cough, congestion, or any other symptoms at this time.   ___________________________________________________________________________    VITALS  T(C): 36.4 (23 @ 20:36), Max: 36.6 (23 @ 08:02)  HR: 67 (23 @ 20:36) (66 - 67)  BP: 123/76 (23 @ 20:36) (123/76 - 130/81)  RR: 15 (23 @ 20:36) (15 - 16)  SpO2: 95% (23 @ 20:36) (94% - 95%)  ___________________________________________________________________________    LABS                          10.3   5.80  )-----------( 339      ( 2023 05:32 )             32.8           141  |  106  |  15  ----------------------------<  99  4.2   |  30  |  0.60    Ca    8.6      2023 05:32    TPro  5.7<L>  /  Alb  2.5<L>  /  TBili  0.2  /  DBili  x   /  AST  15  /  ALT  14  /  AlkPhos  67      ___________________________________________________________________________    MEDICATIONS  (STANDING):  acetaminophen     Tablet .. 975 milliGRAM(s) Oral every 8 hours  bisacodyl 5 milliGRAM(s) Oral at bedtime  cephalexin 500 milliGRAM(s) Oral four times a day  enoxaparin Injectable 40 milliGRAM(s) SubCutaneous every 24 hours  gabapentin 100 milliGRAM(s) Oral three times a day  influenza   Vaccine 0.5 milliLiter(s) IntraMuscular once  polyethylene glycol 3350 17 Gram(s) Oral daily  senna 2 Tablet(s) Oral at bedtime    MEDICATIONS  (PRN):  diphenhydrAMINE 25 milliGRAM(s) Oral every 6 hours PRN Rash and/or Itching  melatonin 6 milliGRAM(s) Oral at bedtime PRN Insomnia  morphine   Solution 15 milliGRAM(s) Oral every 4 hours PRN Moderate Pain (4 - 6)  morphine   Solution 30 milliGRAM(s) Oral every 4 hours PRN Severe Pain (7 - 10)  ondansetron   Disintegrating Tablet 4 milliGRAM(s) Oral every 6 hours PRN Nausea and/or Vomiting    ___________________________________________________________________________    PHYSICAL EXAM:      Gen - NAD, Comfortable  HEENT - NCAT, EOMI  Pulm - CTAB, No wheeze, No rhonchi, No crackles  Cardiovascular - RRR, S1S2, No murmurs  Abdomen - Soft, NT/ND, +BS  Extremities - No C/C/E, No calf tenderness  Neuro-     Cognitive - AAOx3     Motor -                     LEFT    UE - ShAB 5/5, EF 5/5, EE 5/5, WE 5/5,  5/5                    RIGHT UE - unable to fully assess due to right jodi-shoulder brace. Patient able to extend and flex fingers and wrist within immobilizer                    LEFT    LE - HF 5/5, KE 5/5, DF 5/5, PF 5/5                    RIGHT LE - HF 5/5, KE 5/5, DF 5/5, PF 5/5        Sensory - Intact to LT distally     Reflexes - DTR Intact, No primitive reflexive  Psychiatric - Mood stable, Affect WNL; some anxiety expressed given post-surgical state  Skin:  large incisional sites across the biceps, right deltoid, right shoulder as well as across the back with a JAIMIE drain. No erythema, purulence, or fluctuance around incisional sites or JAIMIE drain insertion.  Wounds: None Present   ___________________________________________________________________________ HPI:  Mrs. Danielle Mathew is a 58 year old right handed female patient with past medical history of being [place], GERD (s/p Nissen fundoplication x3), hiatal and paraesophageal hernia (laparoscopic paraesophageal hernia repair) , bursitis, b/l chronic knee pain, synovial chondromatosis, partial paralysis of vocal cords, myomectomy, sinus surgery, , ear surgery, and right shoulder surgery x6 who [place] [facility] [place] [facility] [place] surgical management of a chondrosarcoma of the right proximal humerus and is currently s/p composite resection of the tumor including axillary nerve, lateral deltoid, humeral head and total shoulder arthroplasty with allograft and latissimus dorsi rotational flap by Dr. Nahid Du. Right UE is NWB throughout with no shoulder ROM to be attempted. Per discussion with surgeon, patient may perform right wrist and right elbow active and active assisted flexion and extension as tolerated while maintaining shoulder precautions. JAIMIE drain remains upon discharge to rehab and should be stripped at least twice a day by rehab staff. Total output number should be recorded and uploaded to patient's portal. Output must be less than 30cc for 2 days before removal. Patient was evaluated by PM&R and therapy for functional deficits, gait/ADL impairments and acute rehabilitation was recommended. Patient was medically optimized for discharge to [facility] IRF on 10/31.  (31 Oct 2023 15:19)      ___________________________________________________________________________    SUBJECTIVE/ROS  Patient was seen and evaluated at bedside today.  Reported no overnight events and is in no acute distress.  Case to be discussed at Interdisciplinary Team meeting later today.  A tentative discharge date will be determined.  Patient is eager to continue participation on the recommended rehabilitation program.  Stated being interested in Meds to Beds service for her prescriptions ahead of discharge.  Denies any CP, SOB, LEOS, palpitations, fever, chills, body aches, cough, congestion, or any other symptoms at this time.   ___________________________________________________________________________    VITALS  T(C): 36.4 (23 @ 20:36), Max: 36.6 (23 @ 08:02)  HR: 67 (23 @ 20:36) (66 - 67)  BP: 123/76 (23 @ 20:36) (123/76 - 130/81)  RR: 15 (23 @ 20:36) (15 - 16)  SpO2: 95% (23 @ 20:36) (94% - 95%)  ___________________________________________________________________________    LAB                        10.7   6.21  )-----------( 357      ( 2023 07:13 )             33.5                             10.3   5.80  )-----------( 339      ( 2023 05:32 )             32.8             140  |  107  |  20  ----------------------------<  102<H>  4.5   |  29  |  0.70    Ca    9.0      2023 07:13    TPro  5.9<L>  /  Alb  2.8<L>  /  TBili  0.3  /  DBili  x   /  AST  15  /  ALT  22  /  AlkPhos  68      LIVER FUNCTIONS - ( 2023 07:13 )  Alb: 2.8 g/dL / Pro: 5.9 g/dL / ALK PHOS: 68 U/L / ALT: 22 U/L / AST: 15 U/L / GGT: x                   141  |  106  |  15  ----------------------------<  99  4.2   |  30  |  0.60    Ca    8.6      2023 05:32    TPro  5.7<L>  /  Alb  2.5<L>  /  TBili  0.2  /  DBili  x   /  AST  15  /  ALT  14  /  AlkPhos  67      ___________________________________________________________________________    MEDICATIONS  (STANDING):  acetaminophen     Tablet .. 975 milliGRAM(s) Oral every 8 hours  bisacodyl 5 milliGRAM(s) Oral at bedtime  cephalexin 500 milliGRAM(s) Oral four times a day  enoxaparin Injectable 40 milliGRAM(s) SubCutaneous every 24 hours  gabapentin 100 milliGRAM(s) Oral three times a day  influenza   Vaccine 0.5 milliLiter(s) IntraMuscular once  polyethylene glycol 3350 17 Gram(s) Oral daily  senna 2 Tablet(s) Oral at bedtime    MEDICATIONS  (PRN):  diphenhydrAMINE 25 milliGRAM(s) Oral every 6 hours PRN Rash and/or Itching  melatonin 6 milliGRAM(s) Oral at bedtime PRN Insomnia  morphine   Solution 15 milliGRAM(s) Oral every 4 hours PRN Moderate Pain (4 - 6)  morphine   Solution 30 milliGRAM(s) Oral every 4 hours PRN Severe Pain (7 - 10)  ondansetron   Disintegrating Tablet 4 milliGRAM(s) Oral every 6 hours PRN Nausea and/or Vomiting    ___________________________________________________________________________    PHYSICAL EXAM:      Gen - NAD, Comfortable  HEENT - NCAT, EOMI  Pulm - CTAB, No wheeze, No rhonchi, No crackles  Cardiovascular - RRR, S1S2, No murmurs  Abdomen - Soft, NT/ND, +BS  Extremities - No C/C/E, No calf tenderness  Neuro-     Cognitive - AAOx3     Motor -                     LEFT    UE - ShAB 5/5, EF 5/5, EE 5/5, WE 5/5,  5/5                    RIGHT UE - unable to fully assess due to right jodi-shoulder brace. Patient able to extend and flex fingers and wrist within immobilizer                    LEFT    LE - HF 5/5, KE 5/5, DF 5/5, PF 5/5                    RIGHT LE - HF 5/5, KE 5/5, DF 5/5, PF 5/5        Sensory - Intact to LT distally     Reflexes - DTR Intact, No primitive reflexive  Psychiatric - Mood stable, Affect WNL; some anxiety expressed given post-surgical state  Skin:  large incisional sites across the biceps, right deltoid, right shoulder as well as across the back with a JAIMIE drain. No erythema, purulence, or fluctuance around incisional sites or JAIMIE drain insertion.  Wounds: None Present   ___________________________________________________________________________

## 2023-11-03 PROCEDURE — 99232 SBSQ HOSP IP/OBS MODERATE 35: CPT

## 2023-11-03 RX ORDER — ONDANSETRON 8 MG/1
1 TABLET, FILM COATED ORAL
Qty: 120 | Refills: 0
Start: 2023-11-03 | End: 2023-12-02

## 2023-11-03 RX ORDER — MORPHINE SULFATE 50 MG/1
7.5 CAPSULE, EXTENDED RELEASE ORAL
Qty: 315 | Refills: 0
Start: 2023-11-03 | End: 2023-11-09

## 2023-11-03 RX ORDER — POLYETHYLENE GLYCOL 3350 17 G/17G
17 POWDER, FOR SOLUTION ORAL
Qty: 0 | Refills: 0 | DISCHARGE
Start: 2023-11-03

## 2023-11-03 RX ORDER — LANOLIN ALCOHOL/MO/W.PET/CERES
2 CREAM (GRAM) TOPICAL
Qty: 60 | Refills: 0
Start: 2023-11-03 | End: 2023-12-02

## 2023-11-03 RX ORDER — DIPHENHYDRAMINE HCL 50 MG
1 CAPSULE ORAL
Qty: 28 | Refills: 0
Start: 2023-11-03 | End: 2023-11-09

## 2023-11-03 RX ORDER — SENNA PLUS 8.6 MG/1
2 TABLET ORAL
Qty: 52 | Refills: 0
Start: 2023-11-03 | End: 2023-11-28

## 2023-11-03 RX ORDER — GABAPENTIN 400 MG/1
1 CAPSULE ORAL
Qty: 90 | Refills: 0
Start: 2023-11-03 | End: 2023-12-02

## 2023-11-03 RX ORDER — ACETAMINOPHEN 500 MG
3 TABLET ORAL
Qty: 0 | Refills: 0 | DISCHARGE
Start: 2023-11-03

## 2023-11-03 RX ORDER — CEPHALEXIN 500 MG
1 CAPSULE ORAL
Qty: 28 | Refills: 0
Start: 2023-11-03 | End: 2023-11-09

## 2023-11-03 RX ADMIN — MORPHINE SULFATE 30 MILLIGRAM(S): 50 CAPSULE, EXTENDED RELEASE ORAL at 06:20

## 2023-11-03 RX ADMIN — MORPHINE SULFATE 30 MILLIGRAM(S): 50 CAPSULE, EXTENDED RELEASE ORAL at 14:03

## 2023-11-03 RX ADMIN — ENOXAPARIN SODIUM 40 MILLIGRAM(S): 100 INJECTION SUBCUTANEOUS at 06:31

## 2023-11-03 RX ADMIN — MORPHINE SULFATE 30 MILLIGRAM(S): 50 CAPSULE, EXTENDED RELEASE ORAL at 22:23

## 2023-11-03 RX ADMIN — Medication 500 MILLIGRAM(S): at 12:50

## 2023-11-03 RX ADMIN — MORPHINE SULFATE 30 MILLIGRAM(S): 50 CAPSULE, EXTENDED RELEASE ORAL at 00:32

## 2023-11-03 RX ADMIN — POLYETHYLENE GLYCOL 3350 17 GRAM(S): 17 POWDER, FOR SOLUTION ORAL at 12:50

## 2023-11-03 RX ADMIN — MORPHINE SULFATE 30 MILLIGRAM(S): 50 CAPSULE, EXTENDED RELEASE ORAL at 05:26

## 2023-11-03 RX ADMIN — Medication 500 MILLIGRAM(S): at 00:33

## 2023-11-03 RX ADMIN — GABAPENTIN 100 MILLIGRAM(S): 400 CAPSULE ORAL at 12:55

## 2023-11-03 RX ADMIN — SENNA PLUS 2 TABLET(S): 8.6 TABLET ORAL at 21:37

## 2023-11-03 RX ADMIN — Medication 975 MILLIGRAM(S): at 21:36

## 2023-11-03 RX ADMIN — Medication 975 MILLIGRAM(S): at 12:56

## 2023-11-03 RX ADMIN — MORPHINE SULFATE 30 MILLIGRAM(S): 50 CAPSULE, EXTENDED RELEASE ORAL at 12:56

## 2023-11-03 RX ADMIN — MORPHINE SULFATE 30 MILLIGRAM(S): 50 CAPSULE, EXTENDED RELEASE ORAL at 21:42

## 2023-11-03 RX ADMIN — Medication 975 MILLIGRAM(S): at 05:27

## 2023-11-03 RX ADMIN — Medication 500 MILLIGRAM(S): at 05:27

## 2023-11-03 RX ADMIN — Medication 975 MILLIGRAM(S): at 22:22

## 2023-11-03 RX ADMIN — Medication 5 MILLIGRAM(S): at 21:37

## 2023-11-03 RX ADMIN — Medication 500 MILLIGRAM(S): at 17:32

## 2023-11-03 RX ADMIN — GABAPENTIN 100 MILLIGRAM(S): 400 CAPSULE ORAL at 21:37

## 2023-11-03 RX ADMIN — GABAPENTIN 100 MILLIGRAM(S): 400 CAPSULE ORAL at 05:27

## 2023-11-03 RX ADMIN — Medication 975 MILLIGRAM(S): at 14:03

## 2023-11-03 NOTE — DISCHARGE NOTE PROVIDER - CARE PROVIDER_API CALL
Pacheco Gonzalez  Physical/Rehab Medicine  [address] [place] [address]  Phone: [phone]  Fax: [phone]  Follow Up Time:

## 2023-11-03 NOTE — DISCHARGE NOTE PROVIDER - NSDCMRMEDTOKEN_GEN_ALL_CORE_FT
acetaminophen 325 mg oral tablet: 3 tab(s) orally every 8 hours  bisacodyl 5 mg oral delayed release tablet: 1 tab(s) orally once a day (at bedtime)  cephalexin 500 mg oral capsule: 1 cap(s) orally 4 times a day  diphenhydrAMINE 25 mg oral capsule: 1 cap(s) orally every 6 hours as needed for Rash and/or Itching  gabapentin 100 mg oral capsule: 1 cap(s) orally 3 times a day  melatonin 3 mg oral tablet: 2 tab(s) orally once a day (at bedtime) as needed for Insomnia  morphine 10 mg/5 mL oral solution: 7.5 milliliter(s) orally every 4 hours as needed for  severe pain MDD: 90mg  ondansetron 4 mg oral tablet, disintegratin tab(s) orally every 6 hours as needed for Nausea and/or Vomiting  polyethylene glycol 3350 oral powder for reconstitution: 17 gram(s) orally once a day  senna leaf extract oral tablet: 2 tab(s) orally once a day (at bedtime)

## 2023-11-03 NOTE — PROGRESS NOTE ADULT - SUBJECTIVE AND OBJECTIVE BOX
Interval History  Patient seen and examined at bedside. No acute events noted.  Patient denies any acute complaints, pain is controlled on current regimen.    ALLERGIES:  No Known Allergies    MEDICATIONS  (STANDING):  acetaminophen     Tablet .. 975 milliGRAM(s) Oral every 8 hours  bisacodyl 5 milliGRAM(s) Oral at bedtime  cephalexin 500 milliGRAM(s) Oral four times a day  enoxaparin Injectable 40 milliGRAM(s) SubCutaneous every 24 hours  gabapentin 100 milliGRAM(s) Oral three times a day  influenza   Vaccine 0.5 milliLiter(s) IntraMuscular once  polyethylene glycol 3350 17 Gram(s) Oral daily  senna 2 Tablet(s) Oral at bedtime    MEDICATIONS  (PRN):  diphenhydrAMINE 25 milliGRAM(s) Oral every 6 hours PRN Rash and/or Itching  melatonin 6 milliGRAM(s) Oral at bedtime PRN Insomnia  morphine   Solution 15 milliGRAM(s) Oral every 4 hours PRN Moderate Pain (4 - 6)  morphine   Solution 30 milliGRAM(s) Oral every 4 hours PRN Severe Pain (7 - 10)  ondansetron   Disintegrating Tablet 4 milliGRAM(s) Oral every 6 hours PRN Nausea and/or Vomiting    Vital Signs Last 24 Hrs  T(F): 97.6 (03 Nov 2023 08:10), Max: 97.6 (02 Nov 2023 20:10)  HR: 58 (03 Nov 2023 08:10) (58 - 64)  BP: 118/75 (03 Nov 2023 08:10) (117/77 - 118/75)  RR: 16 (03 Nov 2023 08:10) (16 - 16)  SpO2: 98% (03 Nov 2023 08:10) (98% - 98%)  I&O's Summary    02 Nov 2023 07:01  -  03 Nov 2023 07:00  --------------------------------------------------------  IN: 0 mL / OUT: 1 mL / NET: -1 mL    BMI (kg/m2): 31.5 (11-02-23 @ 06:49)    PHYSICAL EXAM:  GENERAL: NAD  HEENT: NCAT  CHEST/LUNG: Clear to percussion bilaterally; No rales, rhonchi, wheezing  HEART: Regular rate and rhythm; No murmurs  ABDOMEN: Soft, Nontender, Nondistended; Bowel sounds present  MUSCULOSKELETAL/EXTREMITIES:  2+ Peripheral Pulses, No LE edema  RUE/upper back incisions healing well, JAIMIE insertion below right axilla w/ suture in place, no erythema or evidence of infection, small amount of bloody fluid in JAIMIE drain  Abduction brace in place  PSYCH: Appropriate affect, Alert & Oriented    LABS:                        10.7   6.21  )-----------( 357      ( 02 Nov 2023 07:13 )             33.5       11-02    140  |  107  |  20  ----------------------------<  102  4.5   |  29  |  0.70    Ca    9.0      02 Nov 2023 07:13    TPro  5.9  /  Alb  2.8  /  TBili  0.3  /  DBili  x   /  AST  15  /  ALT  22  /  AlkPhos  68  11-02     Urinalysis Basic - ( 02 Nov 2023 07:13 )    Color: x / Appearance: x / SG: x / pH: x  Gluc: 102 mg/dL / Ketone: x  / Bili: x / Urobili: x   Blood: x / Protein: x / Nitrite: x   Leuk Esterase: x / RBC: x / WBC x   Sq Epi: x / Non Sq Epi: x / Bacteria: x        COVID-19 PCR: NotDetec (10-31-23 @ 21:42)      Care Discussed with Consultants/Other Providers: Yes

## 2023-11-03 NOTE — PROGRESS NOTE ADULT - SUBJECTIVE AND OBJECTIVE BOX
HPI:  Mrs. Danielle Mathew is a 58 year old right handed female patient with past medical history of being [place], GERD (s/p Nissen fundoplication x3), hiatal and paraesophageal hernia (laparoscopic paraesophageal hernia repair) , bursitis, b/l chronic knee pain, synovial chondromatosis, partial paralysis of vocal cords, myomectomy, sinus surgery, , ear surgery, and right shoulder surgery x6 who presented to [facility] on 10/18 for surgical management of a chondrosarcoma of the right proximal humerus and is currently s/p composite resection of the tumor including axillary nerve, lateral deltoid, humeral head and total shoulder arthroplasty with allograft and latissimus dorsi rotational flap by Dr. Nahid Du. Right UE is NWB throughout with no shoulder ROM to be attempted. Per discussion with surgeon, patient may perform right wrist and right elbow active and active assisted flexion and extension as tolerated while maintaining shoulder precautions. JAIMIE drain remains upon discharge to rehab and should be stripped at least twice a day by rehab staff. Total output number should be recorded and uploaded to patient's portal. Output must be less than 30cc for 2 days before removal. Patient was evaluated by PM&R and therapy for functional deficits, gait/ADL impairments and acute rehabilitation was recommended. Patient was medically optimized for discharge to [facility] IRF on 10/31.  (31 Oct 2023 15:19)    TDD:     ___________________________________________________________________________  SUBJECTIVE/ROS  Patient was seen and evaluated at bedside today.  Reported no overnight events and is in no acute distress.  JAIMIE drain with minimal output today. Will plan to remove tomorrow.   Case discussed at Interdisciplinary Team meeting yesterday.  A tentative discharge date will be determined.  Patient is eager to continue participation on the recommended rehabilitation program.  Stated being interested in Meds to Beds service for her prescriptions ahead of discharge.  Denies any CP, SOB, LEOS, palpitations, fever, chills, body aches, cough, congestion, or any other symptoms at this time.   ___________________________________________________________________________    Vital Signs Last 24 Hrs  T(C): 36.4 (2023 08:10), Max: 36.4 (2023 20:10)  T(F): 97.6 (2023 08:10), Max: 97.6 (2023 20:10)  HR: 58 (2023 08:10) (58 - 64)  BP: 118/75 (2023 08:10) (117/77 - 118/75)  BP(mean): --  RR: 16 (2023 08:10) (16 - 16)  SpO2: 98% (2023 08:10) (98% - 98%)    Parameters below as of 2023 08:10  Patient On (Oxygen Delivery Method): room air      ___________________________________________________________________________    LAB                        10.7   6.21  )-----------( 357      ( 2023 07:13 )             33.5                             10.3   5.80  )-----------( 339      ( 2023 05:32 )             32.8         11-02    140  |  107  |  20  ----------------------------<  102<H>  4.5   |  29  |  0.70    Ca    9.0      2023 07:13    TPro  5.9<L>  /  Alb  2.8<L>  /  TBili  0.3  /  DBili  x   /  AST  15  /  ALT  22  /  AlkPhos  68  11-02    LIVER FUNCTIONS - ( 2023 07:13 )  Alb: 2.8 g/dL / Pro: 5.9 g/dL / ALK PHOS: 68 U/L / ALT: 22 U/L / AST: 15 U/L / GGT: x                   141  |  106  |  15  ----------------------------<  99  4.2   |  30  |  0.60    Ca    8.6      2023 05:32    TPro  5.7<L>  /  Alb  2.5<L>  /  TBili  0.2  /  DBili  x   /  AST  15  /  ALT  14  /  AlkPhos  67      ___________________________________________________________________________    MEDICATIONS  (STANDING):  acetaminophen     Tablet .. 975 milliGRAM(s) Oral every 8 hours  bisacodyl 5 milliGRAM(s) Oral at bedtime  cephalexin 500 milliGRAM(s) Oral four times a day  enoxaparin Injectable 40 milliGRAM(s) SubCutaneous every 24 hours  gabapentin 100 milliGRAM(s) Oral three times a day  influenza   Vaccine 0.5 milliLiter(s) IntraMuscular once  polyethylene glycol 3350 17 Gram(s) Oral daily  senna 2 Tablet(s) Oral at bedtime    MEDICATIONS  (PRN):  diphenhydrAMINE 25 milliGRAM(s) Oral every 6 hours PRN Rash and/or Itching  melatonin 6 milliGRAM(s) Oral at bedtime PRN Insomnia  morphine   Solution 30 milliGRAM(s) Oral every 4 hours PRN Severe Pain (7 - 10)  morphine   Solution 15 milliGRAM(s) Oral every 4 hours PRN Moderate Pain (4 - 6)  ondansetron   Disintegrating Tablet 4 milliGRAM(s) Oral every 6 hours PRN Nausea and/or Vomiting      ___________________________________________________________________________    PHYSICAL EXAM:  Gen - NAD, Comfortable  HEENT - NCAT, EOMI  Pulm - CTAB, No wheeze, No rhonchi, No crackles  Cardiovascular - RRR, S1S2, No murmurs  Abdomen - Soft, NT/ND, +BS  Extremities - No C/C/E, No calf tenderness  Neuro-     Cognitive - AAOx3     Motor -                     LEFT    UE - ShAB 5/5, EF 5/5, EE 5/5, WE 5/5,  5/5                    RIGHT UE - unable to fully assess due to right jodi-shoulder brace. Patient able to extend and flex fingers and wrist within immobilizer                    LEFT    LE - HF 5/5, KE 5/5, DF 5/5, PF 5/5                    RIGHT LE - HF 5/5, KE 5/5, DF 5/5, PF 5/5        Sensory - Intact to LT distally     Reflexes - DTR Intact, No primitive reflexive  Psychiatric - Mood stable, Affect WNL; some anxiety expressed given post-surgical state  Skin:  large incisional sites across the biceps, right deltoid, right shoulder as well as across the back with a JAIMIE drain. No erythema, purulence, or fluctuance around incisional sites or JAIMIE drain insertion.  Wounds: None Present   ___________________________________________________________________________ HPI:  Mrs. Danielle Mathew is a 58 year old right handed female patient with past medical history of being [place], GERD (s/p Nissen fundoplication x3), hiatal and paraesophageal hernia (laparoscopic paraesophageal hernia repair) , bursitis, b/l chronic knee pain, synovial chondromatosis, partial paralysis of vocal cords, myomectomy, sinus surgery, , ear surgery, and right shoulder surgery x6 who presented to [facility] on 10/18 for surgical management of a chondrosarcoma of the right proximal humerus and is currently s/p composite resection of the tumor including axillary nerve, lateral deltoid, humeral head and total shoulder arthroplasty with allograft and latissimus dorsi rotational flap by Dr. Nahid Du. Right UE is NWB throughout with no shoulder ROM to be attempted. Per discussion with surgeon, patient may perform right wrist and right elbow active and active assisted flexion and extension as tolerated while maintaining shoulder precautions. JAIMIE drain remains upon discharge to rehab and should be stripped at least twice a day by rehab staff. Total output number should be recorded and uploaded to patient's portal. Output must be less than 30cc for 2 days before removal. Patient was evaluated by PM&R and therapy for functional deficits, gait/ADL impairments and acute rehabilitation was recommended. Patient was medically optimized for discharge to [facility] IR[facility] on 10/31.  (31 Oct 2023 15:19)    TDD:     ___________________________________________________________________________    SUBJECTIVE/ROS  Patient was seen and evaluated at bedside today.  Reported no overnight events and is in no acute distress.  JAIMIE drain with minimal output today. Will plan to remove tomorrow.   Case discussed at Interdisciplinary Team meeting yesterday.  A tentative discharge date will be determined.  Patient is eager to continue participation on the recommended rehabilitation program.  Stated being interested in Meds to Beds service for her prescriptions ahead of discharge.  Denies any CP, SOB, LEOS, palpitations, fever, chills, body aches, cough, congestion, or any other symptoms at this time.     ___________________________________________________________________________    Vital Signs Last 24 Hrs  T(C): 36.4 (2023 08:10), Max: 36.4 (2023 20:10)  T(F): 97.6 (2023 08:10), Max: 97.6 (2023 20:10)  HR: 58 (2023 08:10) (58 - 64)  BP: 118/75 (2023 08:10) (117/77 - 118/75)  RR: 16 (2023 08:10) (16 - 16)  SpO2: 98% (2023 08:10) (98% - 98%)    ___________________________________________________________________________    LAB                        10.7   6.21  )-----------( 357      ( 2023 07:13 )             33.5                             10.3   5.80  )-----------( 339      ( 2023 05:32 )             32.8         11-02    140  |  107  |  20  ----------------------------<  102<H>  4.5   |  29  |  0.70    Ca    9.0      2023 07:13    TPro  5.9<L>  /  Alb  2.8<L>  /  TBili  0.3  /  DBili  x   /  AST  15  /  ALT  22  /  AlkPhos  68  11-02    LIVER FUNCTIONS - ( 2023 07:13 )  Alb: 2.8 g/dL / Pro: 5.9 g/dL / ALK PHOS: 68 U/L / ALT: 22 U/L / AST: 15 U/L / GGT: x                   141  |  106  |  15  ----------------------------<  99  4.2   |  30  |  0.60    Ca    8.6      2023 05:32    TPro  5.7<L>  /  Alb  2.5<L>  /  TBili  0.2  /  DBili  x   /  AST  15  /  ALT  14  /  AlkPhos  67      ___________________________________________________________________________    MEDICATIONS  (STANDING):  acetaminophen     Tablet .. 975 milliGRAM(s) Oral every 8 hours  bisacodyl 5 milliGRAM(s) Oral at bedtime  cephalexin 500 milliGRAM(s) Oral four times a day  enoxaparin Injectable 40 milliGRAM(s) SubCutaneous every 24 hours  gabapentin 100 milliGRAM(s) Oral three times a day  influenza   Vaccine 0.5 milliLiter(s) IntraMuscular once  polyethylene glycol 3350 17 Gram(s) Oral daily  senna 2 Tablet(s) Oral at bedtime    MEDICATIONS  (PRN):  diphenhydrAMINE 25 milliGRAM(s) Oral every 6 hours PRN Rash and/or Itching  melatonin 6 milliGRAM(s) Oral at bedtime PRN Insomnia  morphine   Solution 30 milliGRAM(s) Oral every 4 hours PRN Severe Pain (7 - 10)  morphine   Solution 15 milliGRAM(s) Oral every 4 hours PRN Moderate Pain (4 - 6)  ondansetron   Disintegrating Tablet 4 milliGRAM(s) Oral every 6 hours PRN Nausea and/or Vomiting      ___________________________________________________________________________    PHYSICAL EXAM:  Gen - NAD, Comfortable  HEENT - NCAT, EOMI  Pulm - CTAB, No wheeze, No rhonchi, No crackles  Cardiovascular - RRR, S1S2, No murmurs  Abdomen - Soft, NT/ND, +BS  Extremities - No C/C/E, No calf tenderness  Neuro-     Cognitive - AAOx3     Motor -                     LEFT    UE - ShAB 5/5, EF 5/5, EE 5/5, WE 5/5,  5/5                    RIGHT UE - unable to fully assess due to right jodi-shoulder brace. Patient able to extend and flex fingers and wrist within immobilizer                    LEFT    LE - HF 5/5, KE 5/5, DF 5/5, PF 5/5                    RIGHT LE - HF 5/5, KE 5/5, DF 5/5, PF 5/5        Sensory - Intact to LT distally     Reflexes - DTR Intact, No primitive reflexive  Psychiatric - Mood stable, Affect WNL; some anxiety expressed given post-surgical state  Skin:  large incisional sites across the biceps, right deltoid, right shoulder as well as across the back with a JAIMIE drain. No erythema, purulence, or fluctuance around incisional sites or JAIMIE drain insertion.  Wounds: None Present   ___________________________________________________________________________

## 2023-11-03 NOTE — PROVIDER CONTACT NOTE (OTHER) - REASON
JAIMIE drain not remaining collapsed reexam lungs CTA Patient to be discharged from ED. Any available test results were discussed with patient and/or family. Verbal instructions given, including instructions to return to ED immediately for any new, worsening, or concerning symptoms. Patient endorsed understanding. Written discharge instructions additionally given, including follow-up plan.

## 2023-11-03 NOTE — DISCHARGE NOTE PROVIDER - HOSPITAL COURSE
Mrs. Danielle Mathew is a 58 year old right handed female patient with past medical history of being [place], GERD (s/p Nissen fundoplication x3), hiatal and paraesophageal hernia (laparoscopic paraesophageal hernia repair) , bursitis, b/l chronic knee pain, synovial chondromatosis, partial paralysis of vocal cords, myomectomy, sinus surgery, , ear surgery, and right shoulder surgery x6 who presented to [facility] on 10/18 for surgical management of a chondrosarcoma of the right proximal humerus and is currently s/p composite resection of the tumor including axillary nerve, lateral deltoid, humeral head and total shoulder arthroplasty with allograft and latissimus dorsi rotational flap by Dr. Nahid Du. Right UE is NWB throughout with no shoulder ROM to be attempted. Per discussion with surgeon, patient may perform right wrist and right elbow active and active assisted flexion and extension as tolerated while maintaining shoulder precautions. JAIMIE drain remains upon discharge to rehab and should be stripped at least twice a day by rehab staff. Total output number should be recorded and uploaded to patient's portal. Output must be less than 30cc for 2 days before removal. Patient was evaluated by PM&R and therapy for functional deficits, gait/ADL impairments and acute rehabilitation was recommended. Patient was medically optimized for admission to [facility] IRF on 10/31. her rehabilitation course has been significant for functional gains in ADLs and mobility while maintaining right shoulder post-surgical precautions.

## 2023-11-03 NOTE — DISCHARGE NOTE NURSING/CASE MANAGEMENT/SOCIAL WORK - NSDCPEFALRISK_GEN_ALL_CORE
For information on Fall & Injury Prevention, visit: [email or web address] OR  [email or web address] OR  https://www.cdc.gov/steadi/patient.html

## 2023-11-03 NOTE — DISCHARGE NOTE PROVIDER - NSDCCPCAREPLAN_GEN_ALL_CORE_FT
PRINCIPAL DISCHARGE DIAGNOSIS  Diagnosis: Chondrosarcoma  Assessment and Plan of Treatment: Chondrosarcoma is a type of bone cancer that starts in cartilage cells. Cartilage is the smooth connective tissue that protects the ends of bones and lines most joints. Chondrosarcoma is the second most common type of primary bone cancer in adults. The exact cause is not known. In your case, it affected the humerus and was treated with a removal thropugh surgery forlloed by shoulder joint replacement marvin. You are advised to continue follow-up as outpatient with your surgeon for further management and recommendations.

## 2023-11-03 NOTE — DISCHARGE NOTE NURSING/CASE MANAGEMENT/SOCIAL WORK - NSSCNAMETXT_GEN_ALL_CORE
[facility] (formerly [facility])

## 2023-11-03 NOTE — PROVIDER CONTACT NOTE (OTHER) - ASSESSMENT
Pt AOX4. VSS. Afebrile. RN noted that JAIMIE drain is not remaining collapsed. RN attempted multiple times to open, squeeze and then close the drain, but it continues to take in air. Little to no drainage over past 24 HOURS

## 2023-11-03 NOTE — DISCHARGE NOTE PROVIDER - NSDCFUADDAPPT_GEN_ALL_CORE_FT
Please schedule a f/u visit with your PCP within 1-2 weeks of your discharge date    Orthopedic Surgery at [facility]   Nahid Palomo   [phone]    Plastic Surgery at [facility]   Meghana Donohue MD   [phone]

## 2023-11-03 NOTE — CHART NOTE - NSCHARTNOTEFT_GEN_A_CORE
REHABILITATION DIAGNOSIS/IMPAIRMENT GROUP CODE:  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 and total shoulder arthroplasty      COMORBIDITIES/COMPLICATING CONDITIONS IMPACTING REHABILITATION:  HEALTH ISSUES - PROBLEM Dx:  - 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  - S/P composite resection of the tumor including axillary nerve, lateral deltoid, and humeral head followed by total shoulder arthroplasty with allograft and latissimus dorsi rotational flap on 10/18  - X-ray 2023 - unchanged mass extending from the right humeral head into the proximal metadiaphysis with an extraosseous soft tissue component consistent with chondrosarcoma  - X-ray 10/18 - right shoulder arthroplasty  - Impaired ADLs and mobility  - Restricted motion on dominant right side - needs dexterity training  - Need for assistance with personal care  - Post-surgical precautions:  Right shoulder - NWB with no clearance for ROM to remain on jodi-shoulder brace  Right elbow - may perform active and active assisted flexion and extension as tolerated while maintaining shoulder precautions.  Right wrist - may perform active and active assisted flexion, extension, radial deviation and ulnar deviation as tolerated while maintaining shoulder precautions.  Monitor JAIMIE drain. Should be stripped at least twice a day. Total output number should be recorded and uploaded to patient's portal. Output must be less than 30cc for 2 days before removal.       PAST MEDICAL & SURGICAL HISTORY:  Chondrosarcoma      Chronic GERD      Osteoarthritis of both knees      [place] (hard of hearing)      Hiatal hernia      Synovial chondromatosis      H/O  section      S/P repair of paraesophageal hernia      History of Nissen fundoplication      H/O myomectomy          Based upon consideration of the patient's impairments, functional status, complicating conditions and any other contributing factors and after information garnered from the assessments of all therapy disciplines involved in treating the patient and other pertinent clinicians:    INTERDISCIPLINARY REHABILITATION INTERVENTIONS:    [ X  ] Transfer Training  [X   ] Bed Mobility  [ X  ] Therapeutic Exercise  [ X  ] Balance/Coordination Exercises  [ X  ] Locomotion retraining  [ X  ] Stairs  [ X  ] Functional Transfer Training  [ X  ] Bowel/Bladder program  [  X ] Pain Management  [ X  ] Skin/Wound Care  [X   ] Visual/Perceptual Training  [X   ] Therapeutic Recreation Activities  [  X ] Neuromuscular Re-education  [  X ] Activities of Daily Living  [   ] Speech Exercise  [   ] Swallowing Exercises  [   ] Vital Stim  [   ] Dietary Supplements  [   ] Calorie Count  [   ] Cognitive Exercises  [   ] Congnitive/Linguistic Treatment  [   ] Behavior Program  [   ] Neuropsych Therapy  [ X  ] Patient/Family Counseling  [ X  ] Family Training  [ X  ] Community Re-entry  [ X  ] Orthotic Evaluation  [   ] Prosthetic Eval/Training    MEDICAL PROGNOSIS:  good     REHAB POTENTIAL:  good     EXPECTED DAILY THERAPY:         PT: 1.5hr/day       OT: 1.5hr/day       P&O: 0    EXPECTED INTENSITY OF PROGRAM:  3 hrs/day    EXPECTED FREQUENCY OF PROGRAM:  5 days/week    ESTIMATED LOS:  5-10 days    ESTIMATED DISPOSITION:  home    INTERDISCIPLINARY FUNCTIONAL OUTCOMES?GOALS:         Gait/Mobility: mod-independent       Transfers: mod-independent       ADLs: mod-independent       Functional Transfers: mod-independent       Medication Management: mod-independent       Communication: independent       Cognitive:               Bladder: continent       Bowel: continent
Surgical PA asked to evaluate surgical drain by rehab team as RN states the output had gone down to 0 and was not holding suction for the last 24 hours. I evaluated the pt and noted that the drain stitch was loose and part of drain had been pulled out where suction tubing was showing. Surgical area surrounding drain was soft and non-tender to palpation, no signs of infection noted. Surgical incisions c/d/i.    Drain [place] plastic surgeon Dr. Donohue [place] [facility] Rehab resident Dr. Jerez reached out to on call surgeon and they approved pulling the drain since it was not holding suction or functioning.  I cut the drain suture and then pulled surgical drain out without issue. Pt did not complain of any pain. Tolerated procedure well. Pt HD stable. Gauze and paper tape was placed over drain site.

## 2023-11-03 NOTE — DISCHARGE NOTE NURSING/CASE MANAGEMENT/SOCIAL WORK - PATIENT PORTAL LINK FT
You can access the FollowMyHealth Patient Portal offered by [facility] by registering at the following website: [email or web address]. By joining [email or web address]’s FollowMyHealth portal, you will also be able to view your health information using other applications (apps) compatible with our system.

## 2023-11-04 PROCEDURE — 99232 SBSQ HOSP IP/OBS MODERATE 35: CPT

## 2023-11-04 RX ORDER — ACETAMINOPHEN 500 MG
3 TABLET ORAL
Qty: 270 | Refills: 0
Start: 2023-11-04 | End: 2023-12-03

## 2023-11-04 RX ADMIN — Medication 975 MILLIGRAM(S): at 07:10

## 2023-11-04 RX ADMIN — Medication 975 MILLIGRAM(S): at 15:00

## 2023-11-04 RX ADMIN — MORPHINE SULFATE 30 MILLIGRAM(S): 50 CAPSULE, EXTENDED RELEASE ORAL at 10:10

## 2023-11-04 RX ADMIN — GABAPENTIN 100 MILLIGRAM(S): 400 CAPSULE ORAL at 05:10

## 2023-11-04 RX ADMIN — Medication 500 MILLIGRAM(S): at 17:06

## 2023-11-04 RX ADMIN — GABAPENTIN 100 MILLIGRAM(S): 400 CAPSULE ORAL at 13:52

## 2023-11-04 RX ADMIN — MORPHINE SULFATE 30 MILLIGRAM(S): 50 CAPSULE, EXTENDED RELEASE ORAL at 05:04

## 2023-11-04 RX ADMIN — MORPHINE SULFATE 30 MILLIGRAM(S): 50 CAPSULE, EXTENDED RELEASE ORAL at 14:02

## 2023-11-04 RX ADMIN — Medication 500 MILLIGRAM(S): at 00:21

## 2023-11-04 RX ADMIN — MORPHINE SULFATE 30 MILLIGRAM(S): 50 CAPSULE, EXTENDED RELEASE ORAL at 09:03

## 2023-11-04 RX ADMIN — Medication 5 MILLIGRAM(S): at 22:20

## 2023-11-04 RX ADMIN — Medication 500 MILLIGRAM(S): at 06:15

## 2023-11-04 RX ADMIN — MORPHINE SULFATE 30 MILLIGRAM(S): 50 CAPSULE, EXTENDED RELEASE ORAL at 18:01

## 2023-11-04 RX ADMIN — Medication 500 MILLIGRAM(S): at 11:48

## 2023-11-04 RX ADMIN — Medication 975 MILLIGRAM(S): at 06:07

## 2023-11-04 RX ADMIN — Medication 975 MILLIGRAM(S): at 23:53

## 2023-11-04 RX ADMIN — GABAPENTIN 100 MILLIGRAM(S): 400 CAPSULE ORAL at 22:20

## 2023-11-04 RX ADMIN — MORPHINE SULFATE 30 MILLIGRAM(S): 50 CAPSULE, EXTENDED RELEASE ORAL at 22:30

## 2023-11-04 RX ADMIN — MORPHINE SULFATE 30 MILLIGRAM(S): 50 CAPSULE, EXTENDED RELEASE ORAL at 21:12

## 2023-11-04 RX ADMIN — MORPHINE SULFATE 30 MILLIGRAM(S): 50 CAPSULE, EXTENDED RELEASE ORAL at 17:06

## 2023-11-04 RX ADMIN — SENNA PLUS 2 TABLET(S): 8.6 TABLET ORAL at 22:20

## 2023-11-04 RX ADMIN — Medication 975 MILLIGRAM(S): at 22:20

## 2023-11-04 RX ADMIN — MORPHINE SULFATE 30 MILLIGRAM(S): 50 CAPSULE, EXTENDED RELEASE ORAL at 13:04

## 2023-11-04 RX ADMIN — POLYETHYLENE GLYCOL 3350 17 GRAM(S): 17 POWDER, FOR SOLUTION ORAL at 11:48

## 2023-11-04 RX ADMIN — ENOXAPARIN SODIUM 40 MILLIGRAM(S): 100 INJECTION SUBCUTANEOUS at 06:06

## 2023-11-04 RX ADMIN — MORPHINE SULFATE 30 MILLIGRAM(S): 50 CAPSULE, EXTENDED RELEASE ORAL at 06:14

## 2023-11-04 RX ADMIN — Medication 975 MILLIGRAM(S): at 13:52

## 2023-11-04 NOTE — PROGRESS NOTE ADULT - SUBJECTIVE AND OBJECTIVE BOX
Cc: Gait dysfunction    HPI: Patient seen and examined at bedside. No acute events overnight. Drain was removed yesterday since it was no longer holding suction, [facility] surgical team on board. Pain overall controlled, no chest pain, no N/V, no Fevers/Chills. No other new ROS. Has been tolerating rehabilitation program.    acetaminophen     Tablet .. 975 milliGRAM(s) Oral every 8 hours  bisacodyl 5 milliGRAM(s) Oral at bedtime  cephalexin 500 milliGRAM(s) Oral four times a day  diphenhydrAMINE 25 milliGRAM(s) Oral every 6 hours PRN  enoxaparin Injectable 40 milliGRAM(s) SubCutaneous every 24 hours  gabapentin 100 milliGRAM(s) Oral three times a day  influenza   Vaccine 0.5 milliLiter(s) IntraMuscular once  melatonin 6 milliGRAM(s) Oral at bedtime PRN  morphine   Solution 15 milliGRAM(s) Oral every 4 hours PRN  morphine   Solution 30 milliGRAM(s) Oral every 4 hours PRN  ondansetron   Disintegrating Tablet 4 milliGRAM(s) Oral every 6 hours PRN  polyethylene glycol 3350 17 Gram(s) Oral daily  senna 2 Tablet(s) Oral at bedtime      T(C): 36.7 (11-04-23 @ 09:06), Max: 36.7 (11-04-23 @ 09:06)  HR: 57 (11-04-23 @ 09:06) (57 - 69)  BP: 119/64 (11-04-23 @ 09:06) (119/64 - 130/81)  RR: 17 (11-04-23 @ 09:06) (17 - 17)  SpO2: 97% (11-04-23 @ 09:06) (97% - 98%)    In NAD  HEENT- EOMI  Heart- S1S2  Lungs- CTA bl.  Abd- + BS, NT  Ext- No calf pain  Neuro- Exam unchanged    CBC 11/2/23 reviewed by me  CMP 11/2/23 reviewed by me  CMP 11/1/23 reviewed by me:     Imp: Patient with diagnosis of Chondrosarcoma s/p resection and TSA  admitted for comprehensive acute rehabilitation.    Plan:  - Continue PT/OT/SLP as indicated  - DVT prophylaxis - Continue Lovenox subQ  - Skin- Turn q2h, check skin daily  - Continue current medications  -Active issues-   Pain - Continue Gabapentin, Tylenol, Morphine PRN  - Patient is stable to continue current rehabilitation program.

## 2023-11-04 NOTE — PROGRESS NOTE ADULT - SUBJECTIVE AND OBJECTIVE BOX
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   no new complaints    REVIEW OF SYSTEMS: remaining ROS negative     PHYSICAL EXAM:  VITALS:  Vital Signs Last 24 Hrs  T(C): 36.7 (04 Nov 2023 09:06), Max: 36.7 (04 Nov 2023 09:06)  T(F): 98.1 (04 Nov 2023 09:06), Max: 98.1 (04 Nov 2023 09:06)  HR: 57 (04 Nov 2023 09:06) (57 - 69)  BP: 119/64 (04 Nov 2023 09:06) (119/64 - 130/81)  BP(mean): --  RR: 17 (04 Nov 2023 09:06) (17 - 17)  SpO2: 97% (04 Nov 2023 09:06) (97% - 98%)    Parameters below as of 04 Nov 2023 09:06  Patient On (Oxygen Delivery Method): room air          GENERAL: NAD,  no increased WOB  HEAD:  Atraumatic, Normocephalic  EYES: EOMI,  conjunctiva and sclera clear  ENMT: Moist mucous membranes  NECK: Supple, No JVD  NERVOUS SYSTEM:  Alert & Oriented X3  CHEST/LUNG: Clear to auscultation bilaterally; No rales, rhonchi, wheezing  HEART: Regular rate and rhythm; S1 S2  ABDOMEN: Soft, Nontender, Nondistended; Bowel sounds present  EXTREMITIES: No clubbing, cyanosis, calf tenderness or edema b/l. RUE/upper back incisions healing well      MEDICATIONS  (STANDING):  acetaminophen     Tablet .. 975 milliGRAM(s) Oral every 8 hours  bisacodyl 5 milliGRAM(s) Oral at bedtime  cephalexin 500 milliGRAM(s) Oral four times a day  enoxaparin Injectable 40 milliGRAM(s) SubCutaneous every 24 hours  gabapentin 100 milliGRAM(s) Oral three times a day  influenza   Vaccine 0.5 milliLiter(s) IntraMuscular once  polyethylene glycol 3350 17 Gram(s) Oral daily  senna 2 Tablet(s) Oral at bedtime    MEDICATIONS  (PRN):  diphenhydrAMINE 25 milliGRAM(s) Oral every 6 hours PRN Rash and/or Itching  melatonin 6 milliGRAM(s) Oral at bedtime PRN Insomnia  morphine   Solution 15 milliGRAM(s) Oral every 4 hours PRN Moderate Pain (4 - 6)  morphine   Solution 30 milliGRAM(s) Oral every 4 hours PRN Severe Pain (7 - 10)  ondansetron   Disintegrating Tablet 4 milliGRAM(s) Oral every 6 hours PRN Nausea and/or Vomiting      Allergies    No Known Allergies    Intolerances              LABS:                 CAPILLARY BLOOD GLUCOSE                    RECENT CULTURES:          RADIOLOGY & ADDITIONAL TESTS:          COVID-19 PCR: NotDetec (10-31-23 @ 21:42)      Care Discussed with Consultants/Other Providers: Yes

## 2023-11-05 VITALS
HEART RATE: 60 BPM | RESPIRATION RATE: 16 BRPM | SYSTOLIC BLOOD PRESSURE: 112 MMHG | TEMPERATURE: 98 F | OXYGEN SATURATION: 98 % | DIASTOLIC BLOOD PRESSURE: 66 MMHG

## 2023-11-05 LAB
GRAM STN FLD: ABNORMAL
GRAM STN FLD: ABNORMAL
SPECIMEN SOURCE: SIGNIFICANT CHANGE UP
SPECIMEN SOURCE: SIGNIFICANT CHANGE UP

## 2023-11-05 PROCEDURE — 85025 COMPLETE CBC W/AUTO DIFF WBC: CPT

## 2023-11-05 PROCEDURE — 80053 COMPREHEN METABOLIC PANEL: CPT

## 2023-11-05 PROCEDURE — 97116 GAIT TRAINING THERAPY: CPT

## 2023-11-05 PROCEDURE — 87070 CULTURE OTHR SPECIMN AEROBIC: CPT

## 2023-11-05 PROCEDURE — 97167 OT EVAL HIGH COMPLEX 60 MIN: CPT

## 2023-11-05 PROCEDURE — 97110 THERAPEUTIC EXERCISES: CPT

## 2023-11-05 PROCEDURE — 86803 HEPATITIS C AB TEST: CPT

## 2023-11-05 PROCEDURE — 97530 THERAPEUTIC ACTIVITIES: CPT

## 2023-11-05 PROCEDURE — 99232 SBSQ HOSP IP/OBS MODERATE 35: CPT

## 2023-11-05 PROCEDURE — 97163 PT EVAL HIGH COMPLEX 45 MIN: CPT

## 2023-11-05 PROCEDURE — 99238 HOSP IP/OBS DSCHRG MGMT 30/<: CPT

## 2023-11-05 PROCEDURE — 97535 SELF CARE MNGMENT TRAINING: CPT

## 2023-11-05 PROCEDURE — 97112 NEUROMUSCULAR REEDUCATION: CPT

## 2023-11-05 PROCEDURE — 87635 SARS-COV-2 COVID-19 AMP PRB: CPT

## 2023-11-05 PROCEDURE — 36415 COLL VENOUS BLD VENIPUNCTURE: CPT

## 2023-11-05 RX ORDER — MORPHINE SULFATE 50 MG/1
2 CAPSULE, EXTENDED RELEASE ORAL
Qty: 84 | Refills: 0
Start: 2023-11-05 | End: 2023-11-11

## 2023-11-05 RX ADMIN — Medication 975 MILLIGRAM(S): at 07:19

## 2023-11-05 RX ADMIN — Medication 500 MILLIGRAM(S): at 00:14

## 2023-11-05 RX ADMIN — MORPHINE SULFATE 30 MILLIGRAM(S): 50 CAPSULE, EXTENDED RELEASE ORAL at 08:41

## 2023-11-05 RX ADMIN — MORPHINE SULFATE 30 MILLIGRAM(S): 50 CAPSULE, EXTENDED RELEASE ORAL at 07:45

## 2023-11-05 RX ADMIN — Medication 500 MILLIGRAM(S): at 06:53

## 2023-11-05 RX ADMIN — ENOXAPARIN SODIUM 40 MILLIGRAM(S): 100 INJECTION SUBCUTANEOUS at 06:55

## 2023-11-05 RX ADMIN — MORPHINE SULFATE 30 MILLIGRAM(S): 50 CAPSULE, EXTENDED RELEASE ORAL at 03:41

## 2023-11-05 RX ADMIN — MORPHINE SULFATE 30 MILLIGRAM(S): 50 CAPSULE, EXTENDED RELEASE ORAL at 02:51

## 2023-11-05 RX ADMIN — GABAPENTIN 100 MILLIGRAM(S): 400 CAPSULE ORAL at 06:53

## 2023-11-05 RX ADMIN — Medication 975 MILLIGRAM(S): at 06:54

## 2023-11-05 RX ADMIN — GABAPENTIN 100 MILLIGRAM(S): 400 CAPSULE ORAL at 13:04

## 2023-11-05 RX ADMIN — Medication 975 MILLIGRAM(S): at 13:04

## 2023-11-05 RX ADMIN — Medication 500 MILLIGRAM(S): at 12:08

## 2023-11-05 RX ADMIN — MORPHINE SULFATE 30 MILLIGRAM(S): 50 CAPSULE, EXTENDED RELEASE ORAL at 12:08

## 2023-11-05 RX ADMIN — MORPHINE SULFATE 30 MILLIGRAM(S): 50 CAPSULE, EXTENDED RELEASE ORAL at 13:03

## 2023-11-05 RX ADMIN — POLYETHYLENE GLYCOL 3350 17 GRAM(S): 17 POWDER, FOR SOLUTION ORAL at 12:10

## 2023-11-05 NOTE — PROGRESS NOTE ADULT - SUBJECTIVE AND OBJECTIVE BOX
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   no new complaints    REVIEW OF SYSTEMS: remaining ROS negative     PHYSICAL EXAM:  VITALS:  Vital Signs Last 24 Hrs  T(C): 36.6 (05 Nov 2023 07:49), Max: 36.7 (04 Nov 2023 21:09)  T(F): 97.9 (05 Nov 2023 07:49), Max: 98 (04 Nov 2023 21:09)  HR: 60 (05 Nov 2023 07:49) (60 - 61)  BP: 112/66 (05 Nov 2023 07:49) (112/66 - 114/73)  BP(mean): --  RR: 16 (05 Nov 2023 07:49) (16 - 17)  SpO2: 98% (05 Nov 2023 07:49) (95% - 98%)    Parameters below as of 05 Nov 2023 07:49  Patient On (Oxygen Delivery Method): room air          GENERAL: NAD,  no increased WOB  HEAD:  Atraumatic, Normocephalic  EYES: EOMI,  conjunctiva and sclera clear  ENMT: Moist mucous membranes  NECK: Supple, No JVD  NERVOUS SYSTEM:  Alert & Oriented X3  CHEST/LUNG: Clear to auscultation bilaterally; No rales, rhonchi, wheezing  HEART: Regular rate and rhythm; S1 S2  ABDOMEN: Soft, Nontender, Nondistended; Bowel sounds present  EXTREMITIES: No clubbing, cyanosis, calf tenderness or edema b/l. RUE/upper back incisions healing well      MEDICATIONS  (STANDING):  acetaminophen     Tablet .. 975 milliGRAM(s) Oral every 8 hours  bisacodyl 5 milliGRAM(s) Oral at bedtime  cephalexin 500 milliGRAM(s) Oral four times a day  enoxaparin Injectable 40 milliGRAM(s) SubCutaneous every 24 hours  gabapentin 100 milliGRAM(s) Oral three times a day  influenza   Vaccine 0.5 milliLiter(s) IntraMuscular once  polyethylene glycol 3350 17 Gram(s) Oral daily  senna 2 Tablet(s) Oral at bedtime    MEDICATIONS  (PRN):  diphenhydrAMINE 25 milliGRAM(s) Oral every 6 hours PRN Rash and/or Itching  melatonin 6 milliGRAM(s) Oral at bedtime PRN Insomnia  morphine   Solution 15 milliGRAM(s) Oral every 4 hours PRN Moderate Pain (4 - 6)  morphine   Solution 30 milliGRAM(s) Oral every 4 hours PRN Severe Pain (7 - 10)  ondansetron   Disintegrating Tablet 4 milliGRAM(s) Oral every 6 hours PRN Nausea and/or Vomiting      Allergies    No Known Allergies    Intolerances              LABS:                 CAPILLARY BLOOD GLUCOSE                    RECENT CULTURES:          RADIOLOGY & ADDITIONAL TESTS:          COVID-19 PCR: NotDetec (10-31-23 @ 21:42)      Care Discussed with Consultants/Other Providers: Yes

## 2023-11-05 NOTE — PROGRESS NOTE ADULT - SUBJECTIVE AND OBJECTIVE BOX
Cc: Gait dysfunction    HPI: Patient seen and examined at bedside. No acute events overnight.   Pain overall controlled, no chest pain, no N/V, no Fevers/Chills. No other new ROS  Has been tolerating rehabilitation program.    acetaminophen     Tablet .. 975 milliGRAM(s) Oral every 8 hours  bisacodyl 5 milliGRAM(s) Oral at bedtime  cephalexin 500 milliGRAM(s) Oral four times a day  diphenhydrAMINE 25 milliGRAM(s) Oral every 6 hours PRN  enoxaparin Injectable 40 milliGRAM(s) SubCutaneous every 24 hours  gabapentin 100 milliGRAM(s) Oral three times a day  influenza   Vaccine 0.5 milliLiter(s) IntraMuscular once  melatonin 6 milliGRAM(s) Oral at bedtime PRN  morphine   Solution 15 milliGRAM(s) Oral every 4 hours PRN  morphine   Solution 30 milliGRAM(s) Oral every 4 hours PRN  ondansetron   Disintegrating Tablet 4 milliGRAM(s) Oral every 6 hours PRN  polyethylene glycol 3350 17 Gram(s) Oral daily  senna 2 Tablet(s) Oral at bedtime      T(C): 36.6 (11-05-23 @ 07:49), Max: 36.7 (11-04-23 @ 21:09)  HR: 60 (11-05-23 @ 07:49) (60 - 61)  BP: 112/66 (11-05-23 @ 07:49) (112/66 - 114/73)  RR: 16 (11-05-23 @ 07:49) (16 - 17)  SpO2: 98% (11-05-23 @ 07:49) (95% - 98%)    Imp: Patient with diagnosis of Chondrosarcoma s/p resection and TSA  admitted for comprehensive acute rehabilitation.    Plan:  - Patient to discharged today, will continue following plan until discharge  - Continue PT/OT/SLP as indicated  - DVT prophylaxis - Continue Lovenox subQ  - Skin- Turn q2h, check skin daily  - Continue current medications  -Active issues-   Pain - Continue Gabapentin, Tylenol, Morphine PRN  - Patient is stable to continue current rehabilitation program.

## 2023-11-05 NOTE — PROGRESS NOTE ADULT - REASON FOR ADMISSION
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

## 2023-11-05 NOTE — PROGRESS NOTE ADULT - ASSESSMENT
58 year old female with past medical history of GERD (s/p Nissen fundoplication x3), hiatal and paraesophageal hernia (laparoscopic paraesophageal hernia repair) , bursitis, b/l chronic knee pain, synovial chondromatosis, partial paralysis of vocal cords,  myomectomy, sinus surgery, , ear surgery, right shoulder surgery x6 presented to [facility] [place] for R humerus bone lesion ccb increased pain and limited movement. 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 She is now s/p wide local resection of R shoulder chondrosarcoma & reconstruction with Allograft, Latissmus dorsus rotational flap on 10/18. Patient now admitted for a multidisciplinary rehab program. 10-31-23.     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 and total shoulder arthroplasty reconstruction with allograft and latissmus dorsus rotational flap on 10/18  -X-ray 2023 shows unchanged mass extending from the right humeral head into the proximal metadiaphysis with an extraosseous soft tissue component consistent with chondrosarcoma  -X-ray 10/18 - right shoulder arthroplasty  -NWB RUE x 6-8 weeks, no shoulder ROM  -Abduction brace at all times   -Pain control and bowel regimen per rehab team   -Comprehensive Multidisciplinary Rehab Program with PT/OT  -Outpatient follow up with orthopedic and plastic surgery   -Continue cephalexin 500mg q6 x 3 months per discharge summary   - s/p JAIMIE drain removal on 11/3     Nausea (Improved)  -Zofran PRN - has not required     DVT PPx  -Lovenox SC    
58 year old female with past medical history of GERD (s/p Nissen fundoplication x3), hiatal and paraesophageal hernia (laparoscopic paraesophageal hernia repair) , bursitis, b/l chronic knee pain, synovial chondromatosis, partial paralysis of vocal cords,  myomectomy, sinus surgery, , ear surgery, right shoulder surgery x6 presented to [facility] [place] for R humerus bone lesion ccb increased pain and limited movement. 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 She is now s/p wide local resection of R shoulder chondrosarcoma & reconstruction with Allograft, Latissmus dorsus rotational flap on 10/18. Patient now admitted for a multidisciplinary rehab program. 10-31-23.     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 and total shoulder arthroplasty reconstruction with allograft and latissmus dorsus rotational flap on 10/18  -X-ray 2023 shows unchanged mass extending from the right humeral head into the proximal metadiaphysis with an extraosseous soft tissue component consistent with chondrosarcoma  -X-ray 10/18 - right shoulder arthroplasty  -NWB RUE x 6-8 weeks, no shoulder ROM  -Abduction brace at all times   -Pain control and bowel regimen per rehab team   -Comprehensive Multidisciplinary Rehab Program with PT/OT  -Outpatient follow up with orthopedic and plastic surgery   -Continue cephalexin 500mg q6 x 3 months per discharge summary   - s/p JAIMIE drain removal on 11/3     Nausea (Improved)  -Zofran PRN - has not required     DVT PPx  -Lovenox SC    
Mrs. Danielle Mathew is a 58 year old right handed female patient with past medical history of being [place], GERD (s/p Nissen fundoplication x3), hiatal and paraesophageal hernia (laparoscopic paraesophageal hernia repair) , bursitis, b/l chronic knee pain, synovial chondromatosis, partial paralysis of vocal cords, myomectomy, sinus surgery, , ear surgery, and right shoulder surgery x6 who is admitted for Acute Inpatient Rehabilitation with a multidisciplinary rehab program at [facility] with functional impairments in ADLs and mobility secondary to having a right proximal humerus chondrosarcoma treated surgically with a composite resection of the tumor including axillary nerve, lateral deltoid, and humeral head followed by total shoulder arthroplasty with allograft and latissimus dorsi rotational flap by Dr. Nahid Du on 10/18 with closure by Plastic Surgery.      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  - S/P composite resection of the tumor including axillary nerve, lateral deltoid, and humeral head followed by total shoulder arthroplasty with allograft and latissimus dorsi rotational flap on 10/18  - X-ray 2023 - unchanged mass extending from the right humeral head into the proximal metadiaphysis with an extraosseous soft tissue component consistent with chondrosarcoma  - X-ray 10/18 - right shoulder arthroplasty  - Impaired ADLs and mobility  - Restricted motion on dominant right side - needs dexterity training  - Need for assistance with personal care  - Post-surgical precautions:  Right shoulder - NWB with no clearance for ROM to remain on jodi-shoulder brace  Right elbow - may perform active and active assisted flexion and extension as tolerated while maintaining shoulder precautions.  Right wrist - may perform active and active assisted flexion, extension, radial deviation and ulnar deviation as tolerated while maintaining shoulder precautions.  Monitor JAIMIE drain. Should be stripped at least twice a day. Total output number should be recorded and uploaded to patient's portal. Output must be less than 30cc for 2 days before removal. Plan to remove  will need to contact surgery PA.   - Comprehensive Multidisciplinary Rehab Program: Start comprehensive rehab program of PT/OT - 3 hours a day, 5 days a week.  - Pain control as below  - Plastic Surgery follow-up as outpatient.   - Cephalexin 500mg q6    Pain  - Tylenol 975mg q8  - Morphine 15mg and 30mg PO. q4h  - gabapentin 100mg TID    nausea  - Zofran     Mood / Cognition  - Neuropsychology consult would likely be of benefit given anxieties and processing of diagnosis as well as change in functional status from independence     Sleep  - Melatonin 3mg PRN     GI / Bowel  - LBM 10/29  - Senna qHS  - Miralax Daily  - GI ppx: none     / Bladder  - Discontinued bladder scans Q8 hours with straight cath for >400cc.  - Toileting schedule every 4 hours    Skin / Pressure injury  - Skin assessment on admission performed [X] :   - Monitor Incisions:  multiple incisional sites across the RUE from the shoulder to the elbow. Clean, dry, intact. No erythema, drainage, or fluctuance.  - JAIMIE drain from back draining serosanguinous fluid    Diet/Dysphagia:  - Diet Consistency: regular  - Supplements: none  - Aspiration Precautions  - SLP consult for swallow function evaluation and treatment given history of vocal cord paralysis   - Nutrition consult    DVT prophylaxis:   - lovneox 40  - SCDs    Outpatient Follow-up:    Orthopedic Surgery at [facility] [place]   Nahid Palomo   [phone]    Plastic Surgery at [facility] [place]   Meghana Donohue MD   [phone]      ---------------    Goals: Safe discharge to home  Estimated Length of Stay: 10-14 days  Rehab Potential: Good  Medical Prognosis: Good  Estimated Disposition: Home with home care      PRESCREEN COMPARISON:  I have reviewed the prescreen information and I have found no relevant changes between the preadmission screening and my post admission evaluation.    RATIONALE FOR INPATIENT ADMISSION: Patient demonstrates the following:  [X] Medically appropriate for rehabilitation admission  [X] Has attainable rehab goals with an appropriate initial discharge plan  [X]Has rehabilitation potential (expected to make a significant improvement within a reasonable period of time)  [X] Requires close medical management by a rehab physician, rehab nursing care, Hospitalist and comprehensive interdisciplinary team (including PT, OT and/or SLP, Prosthetics and Orthotics)        Maintaining R UE NWB throughout.   Right shoulder sling: no R arm abduction past 90 degrees     10/30/2023 Orthopedic note: JAIMIE drain back   JAIMIE drain care in rehab. Per plastics team, JAIMIE drain to remain upon discharge to rehab. JAIMIE drain should be stripped at least twice a day by rehab staff. Total output number should be recorded and uploaded to patient's portal. Once patient has been discharged from rehab she will see Dr. Srinivasan in her outpatient follow up visit where drain will be removed in clinic. JAIMIE drain output must be less than 30ccs x 2 days before removal.          Attestation Statements:  I have personally seen and examined the patient.  I fully participated in the care of this patient.  I have made amendments to the documentation where necessary, and agree with the history, physical exam, and plan as documented by the Resident.     I independently performed the documented the history, exam, and medical decision making. I have made amendments to the documentation where necessary. I have personally seen and examined the patient. Medical records were reviewed and I have made amendments to the documentation where necessary and adjusted the history, physical examination, and plan as documented by the Resident Physician. Patient was seen and evaluated at bedside today. Reported no overnight events and is in no acute distress. Patient was thoroughly informed regarding precautions prior to admission by her surgical team. A discussion took place with her Orthopedic Surgeon, Dr. Nahid Du, regarding post-surgical precautions and these were communicated with OT and PT team. She is eager to participate on the recommended rehabilitation program. Denies any CP, SOB, LEOS, palpitations, fever, chills, body aches, cough, congestion, or any other symptoms at this time. Admission vitals, labs, and physical exam are outlined below.      LAB                        10.3   5.80  )-----------( 339      ( 2023 05:32 )             32.8     11    141  |  106  |  15  ----------------------------<  99  4.2   |  30  |  0.60    Ca    8.6      2023 05:32    TPro  5.7<L>  /  Alb  2.5<L>  /  TBili  0.2  /  DBili  x   /  AST  15  /  ALT  14  /  AlkPhos  67  11-    LIVER FUNCTIONS - ( 2023 05:32 )  Alb: 2.5 g/dL / Pro: 5.7 g/dL / ALK PHOS: 67 U/L / ALT: 14 U/L / AST: 15 U/L / GGT: x             PHYSICAL EXAM  
58 year old female with past medical history of GERD (s/p Nissen fundoplication x3), hiatal and paraesophageal hernia (laparoscopic paraesophageal hernia repair) , bursitis, b/l chronic knee pain, synovial chondromatosis, partial paralysis of vocal cords,  myomectomy, sinus surgery, , ear surgery, right shoulder surgery x6 presented to [facility] [place] for R humerus bone lesion ccb increased pain and limited movement. 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 She is now s/p wide local resection of R shoulder chondrosarcoma & reconstruction with Allograft, Latissmus dorsus rotational flap on 10/18. Patient now admitted for a multidisciplinary rehab program. 10-31-23.     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 Should be stripped at least twice a day by rehab staff. Total output number should be recorded and uploaded to patient's portal. Output must be less than 30cc for 2 days before removal.   -Pain control and bowel regimen per rehab team   -Comprehensive Multidisciplinary Rehab Program with PT/OT  -Outpatient follow up with orthopedic and plastic surgery   -Continue cephalexin 500mg q6 x 3 months per discharge summary     10/30/2023 Orthopedic note: JAIMIE drain back   JAIMIE drain care in rehab. Per plastics team, JAIMIE drain to remain upon discharge to rehab. JAIMIE drain should be stripped at least twice a day by rehab staff. Total output number should be recorded and uploaded to patient's portal. Once patient has been discharged from rehab she will see Dr. Srinivasan in her outpatient follow up visit where drain will be removed in clinic. JAIMIE drain output must be less than 30ccs x 2 days before removal.       Nausea (Improved)  -Zofran PRN - has not required     DVT PPx  -Lovenox SC    
Mrs. Danielle Mathew is a 58 year old right handed female patient with past medical history of being [place], GERD (s/p Nissen fundoplication x3), hiatal and paraesophageal hernia (laparoscopic paraesophageal hernia repair) , bursitis, b/l chronic knee pain, synovial chondromatosis, partial paralysis of vocal cords, myomectomy, sinus surgery, , ear surgery, and right shoulder surgery x6 who is admitted for Acute Inpatient Rehabilitation with a [facility] program [facility] with functional impairments in ADLs and mobility secondary to having a right proximal humerus chondrosarcoma treated surgically with a composite resection of the tumor including axillary nerve, lateral deltoid, and humeral head followed by total shoulder arthroplasty with allograft and latissimus dorsi rotational flap by Dr. Nahid Du on 10/18 with closure by Plastic Surgery.      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  - S/P composite resection of the tumor including axillary nerve, lateral deltoid, and humeral head followed by total shoulder arthroplasty with allograft and latissimus dorsi rotational flap on 10/18  - X-ray 2023 - unchanged mass extending from the right humeral head into the proximal metadiaphysis with an extraosseous soft tissue component consistent with chondrosarcoma  - X-ray 10/18 - right shoulder arthroplasty  - Impaired ADLs and mobility  - Restricted motion on dominant right side - needs dexterity training  - Need for assistance with personal care  - Post-surgical precautions:  Right shoulder - NWB with no clearance for ROM to remain on jodi-shoulder brace  Right elbow - may perform active and active assisted flexion and extension as tolerated while maintaining shoulder precautions.  Right wrist - may perform active and active assisted flexion, extension, radial deviation and ulnar deviation as tolerated while maintaining shoulder precautions.  Monitor JAIMIE drain. Should be stripped at least twice a day. Total output number should be recorded and uploaded to patient's portal. Output must be less than 30cc for 2 days before removal.   - Comprehensive Multidisciplinary Rehab Program: Start comprehensive rehab program of PT/OT - 3 hours a day, 5 days a week.  - Pain control as below  - Plastic Surgery follow-up as outpatient.   - Cephalexin 500mg q6    Pain  - Tylenol 975mg q8  - Morphine 15mg and 30mg PO. q4h  - gabapentin 100mg TID    nausea  - Zofran     Mood / Cognition  - Neuropsychology consult would likely be of benefit given anxieties and processing of diagnosis as well as change in functional status from independence     Sleep  - Melatonin 3mg PRN     GI / Bowel  - LBM 10/29  - Senna qHS  - Miralax Daily  - GI ppx: none     / Bladder  - Continue bladder scans Q8 hours with straight cath for >400cc.  - Toileting schedule every 4 hours    Skin / Pressure injury  - Skin assessment on admission performed [X] :   - Monitor Incisions:  multiple incisional sites across the RUE from the shoulder to the elbow. Clean, dry, intact. No erythema, drainage, or fluctuance.  - JAIMIE drain from back draining serosanguinous fluid    Diet/Dysphagia:  - Diet Consistency: regular  - Supplements: none  - Aspiration Precautions  - SLP consult for swallow function evaluation and treatment given history of vocal cord paralysis   - Nutrition consult    DVT prophylaxis:   - lovneox 40  - SCDs    Outpatient Follow-up:    [place] [facility] [place]   Nahid Palomo   [phone]    [place] at [facility] [place]   eMghana Donohue MD   [phone]      ---------------    Goals: Safe discharge to home  Estimated Length of Stay: 10-14 days  Rehab Potential: Good  Medical Prognosis: Good  Estimated Disposition: Home with home care      PRESCREEN COMPARISON:  I have reviewed the prescreen information and I have found no relevant changes between the preadmission screening and my post admission evaluation.    RATIONALE FOR INPATIENT ADMISSION: Patient demonstrates the following:  [X] Medically appropriate for rehabilitation admission  [X] Has attainable rehab goals with an appropriate initial discharge plan  [X]Has rehabilitation potential (expected to make a significant improvement within a reasonable period of time)  [X] Requires close medical management by a rehab physician, rehab nursing care, Hospitalist and comprehensive interdisciplinary team (including PT, OT and/or SLP, Prosthetics and Orthotics)        Maintaining R UE NWB throughout.   Right shoulder sling: no R arm abduction past 90 degrees     10/30/2023 Orthopedic note: JAIMIE drain back   JAIMIE drain care in rehab. Per plastics team, JAIMIE drain to remain upon discharge to rehab. JAIMIE drain should be stripped at least twice a day by rehab staff. Total output number should be recorded and uploaded to patient's portal. Once patient has been discharged from rehab she will see Dr. Srinivasan in her outpatient follow up visit where drain will be removed in clinic. JAIMIE drain output must be less than 30ccs x 2 days before removal.          Attestation Statements:  I have personally seen and examined the patient.  I fully participated in the care of this patient.  I have made amendments to the documentation where necessary, and agree with the history, physical exam, and plan as documented by the Resident.     I independently performed the documented the history, exam, and medical decision making. I have made amendments to the documentation where necessary. I have personally seen and examined the patient. Medical records were reviewed and I have made amendments to the documentation where necessary and adjusted the history, physical examination, and plan as documented by the Resident Physician. Patient was seen and evaluated at bedside today. Reported no overnight events and is in no acute distress. Patient was thoroughly informed regarding precautions prior to admission by her surgical team. A discussion took place with her Orthopedic Surgeon, Dr. Nahid Du, regarding post-surgical precautions and these were communicated with OT and PT team. She is eager to participate on the recommended rehabilitation program. Denies any CP, SOB, LEOS, palpitations, fever, chills, body aches, cough, congestion, or any other symptoms at this time. Admission vitals, labs, and physical exam are outlined below.      LAB                        10.3   5.80  )-----------( 339      ( 2023 05:32 )             32.8     11    141  |  106  |  15  ----------------------------<  99  4.2   |  30  |  0.60    Ca    8.6      2023 05:32    TPro  5.7<L>  /  Alb  2.5<L>  /  TBili  0.2  /  DBili  x   /  AST  15  /  ALT  14  /  AlkPhos  67  11    LIVER FUNCTIONS - ( 2023 05:32 )  Alb: 2.5 g/dL / Pro: 5.7 g/dL / ALK PHOS: 67 U/L / ALT: 14 U/L / AST: 15 U/L / GGT: x             PHYSICAL EXAM

## 2023-11-07 LAB
CULTURE RESULTS: ABNORMAL
CULTURE RESULTS: ABNORMAL
SPECIMEN SOURCE: SIGNIFICANT CHANGE UP
SPECIMEN SOURCE: SIGNIFICANT CHANGE UP

## 2023-12-04 ENCOUNTER — RX RENEWAL (OUTPATIENT)
Age: 58
End: 2023-12-04

## 2023-12-04 DIAGNOSIS — G47.00 INSOMNIA, UNSPECIFIED: ICD-10-CM

## 2023-12-04 PROBLEM — Z00.00 ENCOUNTER FOR PREVENTIVE HEALTH EXAMINATION: Status: ACTIVE | Noted: 2023-12-04

## 2023-12-04 RX ORDER — MULTIVITAMIN
3 TABLET ORAL
Qty: 60 | Refills: 0 | Status: ACTIVE | COMMUNITY
Start: 2023-12-04 | End: 1900-01-01